# Patient Record
Sex: FEMALE | Race: WHITE | Employment: OTHER | URBAN - METROPOLITAN AREA
[De-identification: names, ages, dates, MRNs, and addresses within clinical notes are randomized per-mention and may not be internally consistent; named-entity substitution may affect disease eponyms.]

---

## 2024-09-16 ENCOUNTER — OFFICE VISIT (OUTPATIENT)
Dept: FAMILY MEDICINE CLINIC | Facility: CLINIC | Age: 74
End: 2024-09-16

## 2024-09-16 VITALS
BODY MASS INDEX: 22.75 KG/M2 | WEIGHT: 128.38 LBS | HEART RATE: 76 BPM | SYSTOLIC BLOOD PRESSURE: 118 MMHG | DIASTOLIC BLOOD PRESSURE: 75 MMHG | HEIGHT: 63 IN

## 2024-09-16 DIAGNOSIS — Z12.31 ENCOUNTER FOR SCREENING MAMMOGRAM FOR MALIGNANT NEOPLASM OF BREAST: ICD-10-CM

## 2024-09-16 DIAGNOSIS — Z13.21 SCREENING FOR ENDOCRINE, NUTRITIONAL, METABOLIC AND IMMUNITY DISORDER: Primary | ICD-10-CM

## 2024-09-16 DIAGNOSIS — Z13.228 SCREENING FOR ENDOCRINE, NUTRITIONAL, METABOLIC AND IMMUNITY DISORDER: Primary | ICD-10-CM

## 2024-09-16 DIAGNOSIS — Z13.0 SCREENING FOR ENDOCRINE, NUTRITIONAL, METABOLIC AND IMMUNITY DISORDER: Primary | ICD-10-CM

## 2024-09-16 DIAGNOSIS — Z80.3 FAMILY HISTORY OF BREAST CANCER IN MOTHER: ICD-10-CM

## 2024-09-16 DIAGNOSIS — R41.89 COGNITIVE CHANGE: ICD-10-CM

## 2024-09-16 DIAGNOSIS — Z13.29 SCREENING FOR ENDOCRINE, NUTRITIONAL, METABOLIC AND IMMUNITY DISORDER: Primary | ICD-10-CM

## 2024-09-16 PROCEDURE — 99203 OFFICE O/P NEW LOW 30 MIN: CPT | Performed by: STUDENT IN AN ORGANIZED HEALTH CARE EDUCATION/TRAINING PROGRAM

## 2024-09-16 RX ORDER — ALENDRONATE SODIUM 70 MG/1
70 TABLET ORAL WEEKLY
COMMUNITY
Start: 2024-05-31

## 2024-09-16 RX ORDER — ROSUVASTATIN CALCIUM 10 MG/1
10 TABLET, COATED ORAL NIGHTLY
COMMUNITY
Start: 2024-08-15

## 2024-09-16 NOTE — PROGRESS NOTES
HPI:    Patient ID: Talita Horan is a 74 year old female.    HPI  Pt presenting to establish care.  Werner present for visit.    Reports active lifestyle with healthy dietary choices  Only healthy concern is recent cognitive changes  Reports acute memory difficulties  Recent worsened decision making abilities  Decreased follow-through  Can feel less confident with far driving abilities (grew up in [place], hasn't wanted to drive solo)  Denies any mood changes or increased stress     is retired  physician   Has 4 kids    Recent elevated cholesterol  Rosuvastatin for last few months    FHx breast CA (mother, grandmother)  Colon CA, esophageal CA, lung CA  Parents smokers    Last Cscope in the last 5yrs    Q2yrs ivan, none recently    Review of Systems   A comprehensive 10 point review of systems was completed.  Pertinent positives and negatives noted in the the HPI.       Current Outpatient Medications   Medication Sig Dispense Refill    rosuvastatin 10 MG Oral Tab Take 1 tablet (10 mg total) by mouth nightly.      alendronate 70 MG Oral Tab Take 1 tablet (70 mg total) by mouth once a week.      Multiple Vitamins-Minerals (WOMENS 50+ MULTI VITAMIN/MIN) Oral Tab Take by mouth daily.       Allergies:  Allergies   Allergen Reactions    Sulfa Antibiotics ITCHING and RASH      Vitals:    09/16/24 1424   BP: 118/75   Pulse: 76   Weight: 128 lb 6.4 oz (58.2 kg)   Height: 5' 3\" (1.6 m)       Body mass index is 22.75 kg/m².   PHYSICAL EXAM:   Physical Exam  Vitals reviewed.   Constitutional:       General: She is not in acute distress.     Appearance: Normal appearance. She is well-developed.   HENT:      Head: Normocephalic and atraumatic.      Right Ear: Tympanic membrane, ear canal and external ear normal.      Left Ear: Tympanic membrane, ear canal and external ear normal.   Eyes:      Conjunctiva/sclera: Conjunctivae normal.   Neck:      Thyroid: No thyroid mass or thyroid tenderness.   Cardiovascular:       Rate and Rhythm: Normal rate and regular rhythm.      Pulses: Normal pulses.      Heart sounds: Normal heart sounds, S1 normal and S2 normal. No murmur heard.  Pulmonary:      Effort: Pulmonary effort is normal. No respiratory distress.      Breath sounds: Normal breath sounds. No wheezing, rhonchi or rales.   Chest:   Breasts:     Right: No mass or tenderness.      Left: No mass or tenderness.   Abdominal:      General: Bowel sounds are normal.      Palpations: Abdomen is soft.      Tenderness: There is no abdominal tenderness. There is no guarding or rebound.   Genitourinary:     General: Normal vulva.      Exam position: Lithotomy position.      Comments: Pessary in place  Musculoskeletal:      Cervical back: Normal range of motion and neck supple. No muscular tenderness.      Right lower leg: No edema.      Left lower leg: No edema.   Lymphadenopathy:      Cervical: No cervical adenopathy.      Upper Body:      Right upper body: No supraclavicular or axillary adenopathy.      Left upper body: No supraclavicular or axillary adenopathy.   Skin:     General: Skin is warm and dry.      Coloration: Skin is not jaundiced.   Neurological:      General: No focal deficit present.      Mental Status: She is alert and oriented to person, place, and time. Mental status is at baseline.   Psychiatric:         Attention and Perception: Attention normal.         Mood and Affect: Mood normal.         Behavior: Behavior normal. Behavior is cooperative.         Cognition and Memory: Cognition normal.             ASSESSMENT/PLAN:   1. Screening for endocrine, nutritional, metabolic and immunity disorder  Will obtain prior records for review  - TSH W Reflex To Free T4 [E]; Future    2. Family history of breast cancer in mother  Will update breast screening    3. Encounter for screening mammogram for malignant neoplasm of breast  - [facility] 2D+3D SCREENING BILAT (CPT=77067/13137); Future    4. Cognitive change  Counseled on  diet/activity changes for wellness  Continue to monitor for now    Pt verbalized understanding and agrees with plan.    Orders Placed This Encounter   Procedures    TSH W Reflex To Free T4 [E]       Meds This Visit:  Requested Prescriptions      No prescriptions requested or ordered in this encounter       Imaging & Referrals:  None         ID#[id number]

## 2024-09-18 ENCOUNTER — HOSPITAL ENCOUNTER (OUTPATIENT)
Dept: MAMMOGRAPHY | Facility: HOSPITAL | Age: 74
Discharge: HOME | End: 2024-09-18
Attending: STUDENT IN AN ORGANIZED HEALTH CARE EDUCATION/TRAINING PROGRAM
Payer: MEDICARE

## 2024-09-18 DIAGNOSIS — Z12.31 ENCOUNTER FOR SCREENING MAMMOGRAM FOR MALIGNANT NEOPLASM OF BREAST: ICD-10-CM

## 2024-09-18 PROCEDURE — 77063 BREAST TOMOSYNTHESIS BI: CPT | Performed by: STUDENT IN AN ORGANIZED HEALTH CARE EDUCATION/TRAINING PROGRAM

## 2024-09-18 PROCEDURE — 77067 SCR MAMMO BI INCL CAD: CPT | Performed by: STUDENT IN AN ORGANIZED HEALTH CARE EDUCATION/TRAINING PROGRAM

## 2024-12-10 ENCOUNTER — OFFICE VISIT (OUTPATIENT)
Dept: FAMILY MEDICINE CLINIC | Facility: CLINIC | Age: 74
End: 2024-12-10

## 2024-12-10 VITALS
WEIGHT: 131 LBS | BODY MASS INDEX: 23.21 KG/M2 | TEMPERATURE: 98 F | DIASTOLIC BLOOD PRESSURE: 70 MMHG | RESPIRATION RATE: 18 BRPM | HEART RATE: 81 BPM | OXYGEN SATURATION: 98 % | HEIGHT: 63 IN | SYSTOLIC BLOOD PRESSURE: 125 MMHG

## 2024-12-10 DIAGNOSIS — R21 RASH: Primary | ICD-10-CM

## 2024-12-10 DIAGNOSIS — L29.9 ITCHY SKIN: ICD-10-CM

## 2024-12-10 PROCEDURE — 1160F RVW MEDS BY RX/DR IN RCRD: CPT

## 2024-12-10 PROCEDURE — 99213 OFFICE O/P EST LOW 20 MIN: CPT

## 2024-12-10 PROCEDURE — 1159F MED LIST DOCD IN RCRD: CPT

## 2024-12-10 RX ORDER — TRIAMCINOLONE ACETONIDE 1 MG/G
CREAM TOPICAL
Qty: 60 G | Refills: 0 | Status: SHIPPED | OUTPATIENT
Start: 2024-12-10

## 2024-12-10 NOTE — PROGRESS NOTES
HPI:    Patient ID: Talita Horan is a 74 year old female.    HPI     Patient here in office with complaint of generalized dry/itchy skin.  Denies new soap, lotion, detergent, or medications.  Has tried hydrocortisone cream at night, provides moderate relief.        Review of Systems   Constitutional: Negative.    Cardiovascular: Negative.    Skin:  Negative for rash.        Dry/itchy   Neurological: Negative.    Psychiatric/Behavioral: Negative.              Current Outpatient Medications   Medication Sig Dispense Refill    triamcinolone 0.1 % External Cream Apply to affected area twice daily x 2 weeks. Stop for 2 weeks, repeat as needed 60 g 0    rosuvastatin 10 MG Oral Tab Take 1 tablet (10 mg total) by mouth nightly.      alendronate 70 MG Oral Tab Take 1 tablet (70 mg total) by mouth once a week.      Multiple Vitamins-Minerals (WOMENS 50+ MULTI VITAMIN/MIN) Oral Tab Take by mouth daily.       Allergies:Allergies[1]   /70   Pulse 81   Temp 97.5 °F (36.4 °C) (Temporal)   Resp 18   Ht 5' 3\" (1.6 m)   Wt 131 lb (59.4 kg)   SpO2 98%   BMI 23.21 kg/m²   Body mass index is 23.21 kg/m².  PHYSICAL EXAM:   Physical Exam  Vitals reviewed.   Constitutional:       Appearance: Normal appearance.   Cardiovascular:      Rate and Rhythm: Normal rate and regular rhythm.      Heart sounds: Normal heart sounds. No murmur heard.     No friction rub. No gallop.   Pulmonary:      Effort: Pulmonary effort is normal. No respiratory distress.      Breath sounds: Normal breath sounds. No stridor. No wheezing, rhonchi or rales.   Chest:      Chest wall: No tenderness.   Skin:     General: Skin is warm.      Findings: No rash.      Comments: Generalized dry skin with mild excoriation   Neurological:      General: No focal deficit present.      Mental Status: She is alert and oriented to person, place, and time.   Psychiatric:         Mood and Affect: Mood normal.         Behavior: Behavior normal.         Thought  Content: Thought content normal.         Judgment: Judgment normal.                ASSESSMENT/PLAN:   1. Rash  - triamcinolone 0.1 % External Cream, Apply to affected area twice daily x 2 weeks. Stop for 2 weeks, repeat as needed   - Derm Referral - In Network    2. Itchy skin  - Use Dove sensitive skin bar soap. Eucerin/Aveeno lotion. Tide/All free and clear detergent   -Referral given to dermatology if symptoms not improving  - Derm Referral - In Network      No orders of the defined types were placed in this encounter.      Meds This Visit:  Requested Prescriptions     Signed Prescriptions Disp Refills    triamcinolone 0.1 % External Cream 60 g 0     Sig: Apply to affected area twice daily x 2 weeks. Stop for 2 weeks, repeat as needed       Imaging & Referrals:  DERM - INTERNAL         ID#[id number]         [1]   Allergies  Allergen Reactions    Sulfa Antibiotics ITCHING and RASH

## 2025-01-23 ENCOUNTER — TELEPHONE (OUTPATIENT)
Dept: FAMILY MEDICINE CLINIC | Facility: CLINIC | Age: 75
End: 2025-01-23

## 2025-01-23 NOTE — TELEPHONE ENCOUNTER
SHALOM w/ answering service [facility]- we have no recent labs for this patient.

## 2025-01-23 NOTE — TELEPHONE ENCOUNTER
Ashia from [facility] is calling to advise the patient has an appointment tomorrow at [facility] and requesting lab results be faxed    TSH Thyroid Stimulating    Vitamin B12    FAX # [phone]

## 2025-01-24 ENCOUNTER — LAB ENCOUNTER (OUTPATIENT)
Dept: LAB | Age: 75
End: 2025-01-24
Attending: FAMILY MEDICINE
Payer: MEDICARE

## 2025-01-24 ENCOUNTER — TELEPHONE (OUTPATIENT)
Dept: INTERNAL MEDICINE CLINIC | Facility: CLINIC | Age: 75
End: 2025-01-24

## 2025-01-24 ENCOUNTER — OFFICE VISIT (OUTPATIENT)
Dept: FAMILY MEDICINE CLINIC | Facility: CLINIC | Age: 75
End: 2025-01-24
Payer: MEDICARE

## 2025-01-24 VITALS
WEIGHT: 130 LBS | DIASTOLIC BLOOD PRESSURE: 69 MMHG | BODY MASS INDEX: 23.04 KG/M2 | HEIGHT: 63 IN | SYSTOLIC BLOOD PRESSURE: 120 MMHG

## 2025-01-24 DIAGNOSIS — R30.0 DYSURIA: ICD-10-CM

## 2025-01-24 DIAGNOSIS — R30.0 DYSURIA: Primary | ICD-10-CM

## 2025-01-24 LAB
BILIRUB UR QL: NEGATIVE
COLOR UR: YELLOW
GLUCOSE UR-MCNC: NORMAL MG/DL
KETONES UR-MCNC: NEGATIVE MG/DL
LEUKOCYTE ESTERASE UR QL STRIP.AUTO: 500
NITRITE UR QL STRIP.AUTO: NEGATIVE
PH UR: 6.5 [PH] (ref 5–8)
SP GR UR STRIP: 1.02 (ref 1–1.03)
UROBILINOGEN UR STRIP-ACNC: NORMAL
WBC #/AREA URNS AUTO: >50 /HPF

## 2025-01-24 PROCEDURE — 87077 CULTURE AEROBIC IDENTIFY: CPT

## 2025-01-24 PROCEDURE — 81001 URINALYSIS AUTO W/SCOPE: CPT

## 2025-01-24 PROCEDURE — 99213 OFFICE O/P EST LOW 20 MIN: CPT | Performed by: FAMILY MEDICINE

## 2025-01-24 PROCEDURE — 87186 SC STD MICRODIL/AGAR DIL: CPT

## 2025-01-24 PROCEDURE — 1159F MED LIST DOCD IN RCRD: CPT | Performed by: FAMILY MEDICINE

## 2025-01-24 PROCEDURE — 87086 URINE CULTURE/COLONY COUNT: CPT

## 2025-01-24 PROCEDURE — 1125F AMNT PAIN NOTED PAIN PRSNT: CPT | Performed by: FAMILY MEDICINE

## 2025-01-24 PROCEDURE — 1160F RVW MEDS BY RX/DR IN RCRD: CPT | Performed by: FAMILY MEDICINE

## 2025-01-24 RX ORDER — CIPROFLOXACIN 500 MG/1
500 TABLET, FILM COATED ORAL 2 TIMES DAILY
Qty: 10 TABLET | Refills: 0 | Status: SHIPPED | OUTPATIENT
Start: 2025-01-24 | End: 2025-01-29

## 2025-01-24 NOTE — TELEPHONE ENCOUNTER
TRAMADOL/APAP 37.5MG/ 325MG tabs   TAKE 1 TABLET BY MOUTH EVERY 8 HOURS as NEEDED.     Prescribed Qty: 249  Last Refill: 05/15/2024    The current prescription has no refills remaining or is about to .. if you want to continue therapy please prepare and fax a valid signed written prescription to the pharmacy.

## 2025-01-24 NOTE — PROGRESS NOTES
Blood pressure 120/69, height 5' 3\" (1.6 m), weight 130 lb (59 kg).          Patient presents today complaining of burning with urination began today.  Denies fever or vomiting.  No chills or back pain.    Objective back without CVA tenderness    Assessment dysuria patient unable to produce urine in office    Plan will go to lab produce urine    Drink cranberry juice sulfa allergy noted    Encouraged compliance for routine health maintenance follow-up with Dr. Dickerson for Medicare annual physical

## 2025-01-28 ENCOUNTER — LAB ENCOUNTER (OUTPATIENT)
Dept: LAB | Age: 75
End: 2025-01-28
Attending: STUDENT IN AN ORGANIZED HEALTH CARE EDUCATION/TRAINING PROGRAM
Payer: MEDICARE

## 2025-01-28 DIAGNOSIS — Z13.21 SCREENING FOR ENDOCRINE, NUTRITIONAL, METABOLIC AND IMMUNITY DISORDER: ICD-10-CM

## 2025-01-28 DIAGNOSIS — Z13.228 SCREENING FOR ENDOCRINE, NUTRITIONAL, METABOLIC AND IMMUNITY DISORDER: ICD-10-CM

## 2025-01-28 DIAGNOSIS — Z13.29 SCREENING FOR ENDOCRINE, NUTRITIONAL, METABOLIC AND IMMUNITY DISORDER: ICD-10-CM

## 2025-01-28 DIAGNOSIS — Z13.0 SCREENING FOR ENDOCRINE, NUTRITIONAL, METABOLIC AND IMMUNITY DISORDER: ICD-10-CM

## 2025-01-28 LAB — TSI SER-ACNC: 1.38 UIU/ML (ref 0.55–4.78)

## 2025-01-28 PROCEDURE — 84443 ASSAY THYROID STIM HORMONE: CPT

## 2025-01-28 PROCEDURE — 36415 COLL VENOUS BLD VENIPUNCTURE: CPT

## 2025-02-08 ENCOUNTER — HOSPITAL ENCOUNTER (OUTPATIENT)
Facility: HOSPITAL | Age: 75
Setting detail: OBSERVATION
Discharge: HOME | End: 2025-02-09
Attending: EMERGENCY MEDICINE | Admitting: INTERNAL MEDICINE
Payer: MEDICARE

## 2025-02-08 ENCOUNTER — ANESTHESIA (OUTPATIENT)
Dept: SURGERY | Facility: HOSPITAL | Age: 75
End: 2025-02-08
Payer: MEDICARE

## 2025-02-08 ENCOUNTER — APPOINTMENT (OUTPATIENT)
Dept: CT IMAGING | Facility: HOSPITAL | Age: 75
End: 2025-02-08
Attending: EMERGENCY MEDICINE
Payer: MEDICARE

## 2025-02-08 ENCOUNTER — ANESTHESIA EVENT (OUTPATIENT)
Dept: SURGERY | Facility: HOSPITAL | Age: 75
End: 2025-02-08
Payer: MEDICARE

## 2025-02-08 DIAGNOSIS — K35.80 ACUTE APPENDICITIS: ICD-10-CM

## 2025-02-08 DIAGNOSIS — K35.30 ACUTE APPENDICITIS WITH LOCALIZED PERITONITIS, WITHOUT PERFORATION, ABSCESS, OR GANGRENE: Primary | ICD-10-CM

## 2025-02-08 LAB
ANION GAP SERPL CALC-SCNC: 9 MMOL/L (ref 0–18)
ATRIAL RATE: 102 BPM
BASOPHILS # BLD AUTO: 0.04 X10(3) UL (ref 0–0.2)
BASOPHILS NFR BLD AUTO: 0.3 %
BILIRUB UR QL: NEGATIVE
BUN BLD-MCNC: 27 MG/DL (ref 9–23)
BUN/CREAT SERPL: 35.1 (ref 10–20)
CALCIUM BLD-MCNC: 9.4 MG/DL (ref 8.7–10.4)
CHLORIDE SERPL-SCNC: 103 MMOL/L (ref 98–112)
CLARITY UR: CLEAR
CO2 SERPL-SCNC: 29 MMOL/L (ref 21–32)
CREAT BLD-MCNC: 0.77 MG/DL
DEPRECATED RDW RBC AUTO: 37.7 FL (ref 35.1–46.3)
EGFRCR SERPLBLD CKD-EPI 2021: 81 ML/MIN/1.73M2 (ref 60–?)
EOSINOPHIL # BLD AUTO: 0.01 X10(3) UL (ref 0–0.7)
EOSINOPHIL NFR BLD AUTO: 0.1 %
ERYTHROCYTE [DISTWIDTH] IN BLOOD BY AUTOMATED COUNT: 11.6 % (ref 11–15)
GLUCOSE BLD-MCNC: 151 MG/DL (ref 70–99)
GLUCOSE UR-MCNC: 100 MG/DL
HCT VFR BLD AUTO: 41.4 %
HGB BLD-MCNC: 14.4 G/DL
HGB UR QL STRIP.AUTO: NEGATIVE
IMM GRANULOCYTES # BLD AUTO: 0.08 X10(3) UL (ref 0–1)
IMM GRANULOCYTES NFR BLD: 0.5 %
KETONES UR-MCNC: 20 MG/DL
LEUKOCYTE ESTERASE UR QL STRIP.AUTO: 500
LYMPHOCYTES # BLD AUTO: 0.67 X10(3) UL (ref 1–4)
LYMPHOCYTES NFR BLD AUTO: 4.3 %
MCH RBC QN AUTO: 31.2 PG (ref 26–34)
MCHC RBC AUTO-ENTMCNC: 34.8 G/DL (ref 31–37)
MCV RBC AUTO: 89.6 FL
MONOCYTES # BLD AUTO: 0.46 X10(3) UL (ref 0.1–1)
MONOCYTES NFR BLD AUTO: 2.9 %
NEUTROPHILS # BLD AUTO: 14.41 X10 (3) UL (ref 1.5–7.7)
NEUTROPHILS # BLD AUTO: 14.41 X10(3) UL (ref 1.5–7.7)
NEUTROPHILS NFR BLD AUTO: 91.9 %
NITRITE UR QL STRIP.AUTO: NEGATIVE
OSMOLALITY SERPL CALC.SUM OF ELEC: 300 MOSM/KG (ref 275–295)
P AXIS: 71 DEGREES
P-R INTERVAL: 142 MS
PH UR: 6 [PH] (ref 5–8)
PLATELET # BLD AUTO: 198 10(3)UL (ref 150–450)
POTASSIUM SERPL-SCNC: 3.6 MMOL/L (ref 3.5–5.1)
PROT UR-MCNC: NEGATIVE MG/DL
Q-T INTERVAL: 354 MS
QRS DURATION: 74 MS
QTC CALCULATION (BEZET): 461 MS
R AXIS: 29 DEGREES
RBC # BLD AUTO: 4.62 X10(6)UL
SODIUM SERPL-SCNC: 141 MMOL/L (ref 136–145)
SP GR UR STRIP: 1.02 (ref 1–1.03)
T AXIS: 55 DEGREES
UROBILINOGEN UR STRIP-ACNC: NORMAL
VENTRICULAR RATE: 102 BPM
WBC # BLD AUTO: 15.7 X10(3) UL (ref 4–11)

## 2025-02-08 PROCEDURE — 0DTJ4ZZ RESECTION OF APPENDIX, PERCUTANEOUS ENDOSCOPIC APPROACH: ICD-10-PCS | Performed by: SURGERY

## 2025-02-08 PROCEDURE — 74177 CT ABD & PELVIS W/CONTRAST: CPT | Performed by: EMERGENCY MEDICINE

## 2025-02-08 PROCEDURE — 44970 LAPAROSCOPY APPENDECTOMY: CPT | Performed by: SURGERY

## 2025-02-08 PROCEDURE — 99223 1ST HOSP IP/OBS HIGH 75: CPT | Performed by: INTERNAL MEDICINE

## 2025-02-08 PROCEDURE — 99221 1ST HOSP IP/OBS SF/LOW 40: CPT | Performed by: SURGERY

## 2025-02-08 RX ORDER — DEXAMETHASONE SODIUM PHOSPHATE 4 MG/ML
VIAL (ML) INJECTION AS NEEDED
Status: DISCONTINUED | OUTPATIENT
Start: 2025-02-08 | End: 2025-02-08 | Stop reason: SURG

## 2025-02-08 RX ORDER — HYDROMORPHONE HYDROCHLORIDE 1 MG/ML
0.4 INJECTION, SOLUTION INTRAMUSCULAR; INTRAVENOUS; SUBCUTANEOUS EVERY 5 MIN PRN
Status: DISCONTINUED | OUTPATIENT
Start: 2025-02-08 | End: 2025-02-08 | Stop reason: HOSPADM

## 2025-02-08 RX ORDER — LIDOCAINE HYDROCHLORIDE 10 MG/ML
INJECTION, SOLUTION EPIDURAL; INFILTRATION; INTRACAUDAL; PERINEURAL AS NEEDED
Status: DISCONTINUED | OUTPATIENT
Start: 2025-02-08 | End: 2025-02-08 | Stop reason: SURG

## 2025-02-08 RX ORDER — METOCLOPRAMIDE HYDROCHLORIDE 5 MG/ML
5 INJECTION INTRAMUSCULAR; INTRAVENOUS EVERY 8 HOURS PRN
Status: DISCONTINUED | OUTPATIENT
Start: 2025-02-08 | End: 2025-02-09

## 2025-02-08 RX ORDER — HYDROCODONE BITARTRATE AND ACETAMINOPHEN 5; 325 MG/1; MG/1
2 TABLET ORAL EVERY 4 HOURS PRN
Status: DISCONTINUED | OUTPATIENT
Start: 2025-02-08 | End: 2025-02-09

## 2025-02-08 RX ORDER — IBUPROFEN 600 MG/1
600 TABLET, FILM COATED ORAL EVERY 6 HOURS PRN
Qty: 15 TABLET | Refills: 1 | Status: SHIPPED | OUTPATIENT
Start: 2025-02-08 | End: 2025-02-15

## 2025-02-08 RX ORDER — HYDROMORPHONE HYDROCHLORIDE 1 MG/ML
0.2 INJECTION, SOLUTION INTRAMUSCULAR; INTRAVENOUS; SUBCUTANEOUS EVERY 5 MIN PRN
Status: DISCONTINUED | OUTPATIENT
Start: 2025-02-08 | End: 2025-02-08 | Stop reason: HOSPADM

## 2025-02-08 RX ORDER — NALOXONE HYDROCHLORIDE 0.4 MG/ML
0.08 INJECTION, SOLUTION INTRAMUSCULAR; INTRAVENOUS; SUBCUTANEOUS AS NEEDED
Status: DISCONTINUED | OUTPATIENT
Start: 2025-02-08 | End: 2025-02-08 | Stop reason: HOSPADM

## 2025-02-08 RX ORDER — POLYETHYLENE GLYCOL 3350 17 G/17G
17 POWDER, FOR SOLUTION ORAL DAILY PRN
Status: DISCONTINUED | OUTPATIENT
Start: 2025-02-08 | End: 2025-02-09

## 2025-02-08 RX ORDER — MORPHINE SULFATE 2 MG/ML
2 INJECTION, SOLUTION INTRAMUSCULAR; INTRAVENOUS ONCE
Status: COMPLETED | OUTPATIENT
Start: 2025-02-08 | End: 2025-02-08

## 2025-02-08 RX ORDER — ROCURONIUM BROMIDE 10 MG/ML
INJECTION, SOLUTION INTRAVENOUS AS NEEDED
Status: DISCONTINUED | OUTPATIENT
Start: 2025-02-08 | End: 2025-02-08 | Stop reason: SURG

## 2025-02-08 RX ORDER — HEPARIN SODIUM 5000 [USP'U]/ML
5000 INJECTION, SOLUTION INTRAVENOUS; SUBCUTANEOUS EVERY 8 HOURS SCHEDULED
Status: DISCONTINUED | OUTPATIENT
Start: 2025-02-08 | End: 2025-02-09

## 2025-02-08 RX ORDER — HYDROCODONE BITARTRATE AND ACETAMINOPHEN 5; 325 MG/1; MG/1
1 TABLET ORAL EVERY 6 HOURS PRN
Qty: 10 TABLET | Refills: 0 | Status: SHIPPED | OUTPATIENT
Start: 2025-02-08

## 2025-02-08 RX ORDER — SODIUM PHOSPHATE, DIBASIC AND SODIUM PHOSPHATE, MONOBASIC 7; 19 G/230ML; G/230ML
1 ENEMA RECTAL ONCE AS NEEDED
Status: DISCONTINUED | OUTPATIENT
Start: 2025-02-08 | End: 2025-02-09

## 2025-02-08 RX ORDER — MORPHINE SULFATE 4 MG/ML
4 INJECTION, SOLUTION INTRAMUSCULAR; INTRAVENOUS EVERY 2 HOUR PRN
Status: DISCONTINUED | OUTPATIENT
Start: 2025-02-08 | End: 2025-02-09

## 2025-02-08 RX ORDER — ONDANSETRON 2 MG/ML
INJECTION INTRAMUSCULAR; INTRAVENOUS AS NEEDED
Status: DISCONTINUED | OUTPATIENT
Start: 2025-02-08 | End: 2025-02-08 | Stop reason: SURG

## 2025-02-08 RX ORDER — DOCUSATE SODIUM 100 MG/1
100 CAPSULE, LIQUID FILLED ORAL 2 TIMES DAILY
Qty: 30 CAPSULE | Refills: 0 | Status: SHIPPED | OUTPATIENT
Start: 2025-02-08

## 2025-02-08 RX ORDER — ONDANSETRON 2 MG/ML
4 INJECTION INTRAMUSCULAR; INTRAVENOUS EVERY 6 HOURS PRN
Status: DISCONTINUED | OUTPATIENT
Start: 2025-02-08 | End: 2025-02-09

## 2025-02-08 RX ORDER — KETOROLAC TROMETHAMINE 15 MG/ML
15 INJECTION, SOLUTION INTRAMUSCULAR; INTRAVENOUS EVERY 6 HOURS PRN
Status: DISCONTINUED | OUTPATIENT
Start: 2025-02-08 | End: 2025-02-09

## 2025-02-08 RX ORDER — SODIUM CHLORIDE 9 MG/ML
125 INJECTION, SOLUTION INTRAVENOUS CONTINUOUS
Status: DISCONTINUED | OUTPATIENT
Start: 2025-02-08 | End: 2025-02-09

## 2025-02-08 RX ORDER — HYDROCODONE BITARTRATE AND ACETAMINOPHEN 5; 325 MG/1; MG/1
1 TABLET ORAL EVERY 4 HOURS PRN
Status: DISCONTINUED | OUTPATIENT
Start: 2025-02-08 | End: 2025-02-09

## 2025-02-08 RX ORDER — HYDROCODONE BITARTRATE AND ACETAMINOPHEN 5; 325 MG/1; MG/1
1 TABLET ORAL EVERY 6 HOURS PRN
Status: DISCONTINUED | OUTPATIENT
Start: 2025-02-08 | End: 2025-02-08 | Stop reason: ALTCHOICE

## 2025-02-08 RX ORDER — ONDANSETRON 2 MG/ML
4 INJECTION INTRAMUSCULAR; INTRAVENOUS ONCE
Status: COMPLETED | OUTPATIENT
Start: 2025-02-08 | End: 2025-02-08

## 2025-02-08 RX ORDER — METRONIDAZOLE 500 MG/100ML
500 INJECTION, SOLUTION INTRAVENOUS ONCE
Status: COMPLETED | OUTPATIENT
Start: 2025-02-08 | End: 2025-02-08

## 2025-02-08 RX ORDER — BISACODYL 10 MG
10 SUPPOSITORY, RECTAL RECTAL
Status: DISCONTINUED | OUTPATIENT
Start: 2025-02-08 | End: 2025-02-09

## 2025-02-08 RX ORDER — MORPHINE SULFATE 10 MG/ML
6 INJECTION, SOLUTION INTRAMUSCULAR; INTRAVENOUS EVERY 10 MIN PRN
Status: DISCONTINUED | OUTPATIENT
Start: 2025-02-08 | End: 2025-02-08 | Stop reason: HOSPADM

## 2025-02-08 RX ORDER — BENZONATATE 100 MG/1
200 CAPSULE ORAL 3 TIMES DAILY PRN
Status: DISCONTINUED | OUTPATIENT
Start: 2025-02-08 | End: 2025-02-09

## 2025-02-08 RX ORDER — SODIUM CHLORIDE, SODIUM LACTATE, POTASSIUM CHLORIDE, CALCIUM CHLORIDE 600; 310; 30; 20 MG/100ML; MG/100ML; MG/100ML; MG/100ML
INJECTION, SOLUTION INTRAVENOUS CONTINUOUS
Status: DISCONTINUED | OUTPATIENT
Start: 2025-02-08 | End: 2025-02-08 | Stop reason: HOSPADM

## 2025-02-08 RX ORDER — ACETAMINOPHEN 325 MG/1
650 TABLET ORAL EVERY 4 HOURS PRN
Status: DISCONTINUED | OUTPATIENT
Start: 2025-02-08 | End: 2025-02-09

## 2025-02-08 RX ORDER — MORPHINE SULFATE 2 MG/ML
2 INJECTION, SOLUTION INTRAMUSCULAR; INTRAVENOUS EVERY 2 HOUR PRN
Status: DISCONTINUED | OUTPATIENT
Start: 2025-02-08 | End: 2025-02-09

## 2025-02-08 RX ORDER — MORPHINE SULFATE 4 MG/ML
4 INJECTION, SOLUTION INTRAMUSCULAR; INTRAVENOUS EVERY 10 MIN PRN
Status: DISCONTINUED | OUTPATIENT
Start: 2025-02-08 | End: 2025-02-08 | Stop reason: HOSPADM

## 2025-02-08 RX ORDER — HYDROMORPHONE HYDROCHLORIDE 1 MG/ML
0.6 INJECTION, SOLUTION INTRAMUSCULAR; INTRAVENOUS; SUBCUTANEOUS EVERY 5 MIN PRN
Status: DISCONTINUED | OUTPATIENT
Start: 2025-02-08 | End: 2025-02-08 | Stop reason: HOSPADM

## 2025-02-08 RX ORDER — MORPHINE SULFATE 2 MG/ML
1 INJECTION, SOLUTION INTRAMUSCULAR; INTRAVENOUS EVERY 2 HOUR PRN
Status: DISCONTINUED | OUTPATIENT
Start: 2025-02-08 | End: 2025-02-09

## 2025-02-08 RX ORDER — SENNOSIDES 8.6 MG
17.2 TABLET ORAL NIGHTLY PRN
Status: DISCONTINUED | OUTPATIENT
Start: 2025-02-08 | End: 2025-02-09

## 2025-02-08 RX ORDER — KETOROLAC TROMETHAMINE 15 MG/ML
15 INJECTION, SOLUTION INTRAMUSCULAR; INTRAVENOUS ONCE
Status: COMPLETED | OUTPATIENT
Start: 2025-02-08 | End: 2025-02-08

## 2025-02-08 RX ORDER — MORPHINE SULFATE 4 MG/ML
2 INJECTION, SOLUTION INTRAMUSCULAR; INTRAVENOUS EVERY 10 MIN PRN
Status: DISCONTINUED | OUTPATIENT
Start: 2025-02-08 | End: 2025-02-08 | Stop reason: HOSPADM

## 2025-02-08 RX ADMIN — LIDOCAINE HYDROCHLORIDE 50 MG: 10 INJECTION, SOLUTION EPIDURAL; INFILTRATION; INTRACAUDAL; PERINEURAL at 11:48:00

## 2025-02-08 RX ADMIN — ROCURONIUM BROMIDE 50 MG: 10 INJECTION, SOLUTION INTRAVENOUS at 11:48:00

## 2025-02-08 RX ADMIN — DEXAMETHASONE SODIUM PHOSPHATE 4 MG: 4 MG/ML VIAL (ML) INJECTION at 11:48:00

## 2025-02-08 RX ADMIN — ONDANSETRON 4 MG: 2 INJECTION INTRAMUSCULAR; INTRAVENOUS at 11:48:00

## 2025-02-08 NOTE — ANESTHESIA PROCEDURE NOTES
Airway  Date/Time: 2/8/2025 11:49 AM  Urgency: Elective    Airway not difficult    General Information and Staff    Patient location during procedure: OR  Anesthesiologist: Cary Calle MD  Performed: anesthesiologist   Performed by: Cary Calle MD  Authorized by: Cary Calle MD      Indications and Patient Condition  Indications for airway management: anesthesia  Sedation level: deep  Preoxygenated: yes  Patient position: sniffing  Mask difficulty assessment: 1 - vent by mask    Final Airway Details  Final airway type: endotracheal airway      Successful airway: ETT  Cuffed: yes   Successful intubation technique: Video laryngoscopy  Facilitating devices/methods: intubating stylet  Endotracheal tube insertion site: oral  Blade: GlideScope  Blade size: #3  ETT size (mm): 7.0    Cormack-Lehane Classification: grade I - full view of glottis  Placement verified by: capnometry   Measured from: teeth  Number of attempts at approach: 1

## 2025-02-08 NOTE — H&P
[facility]  part of [facility]    History and Physical     Talita Hroan Patient Status:  Emergency    1950 MRN [id number]   Location [facility] POST ANESTHESIA CARE UNIT Attending Darwin Valencia MD   Hosp Day # 0 PCP Charley Dickerson MD     History of Present Illness: Talita Horan is a 74 year old female with a past medical history of uterine prolapse, mild cognitive impairment and HLD presented ot the ER with complaints of abdominal pain that began last night. The pain is sharp, non-radiating, in the RLQ and associated with nausea and vomiting.   She denied any other complaints at the time of interview.    Past Medical History:  Past Medical History:    Hyperlipidemia    UTI (urinary tract infection)        Past Surgical History:   Past Surgical History:   Procedure Laterality Date    Colonoscopy      Knee surgery Right           Tonsillectomy         Social History:  reports that she has never smoked. She has never been exposed to tobacco smoke. She has never used smokeless tobacco. She reports current alcohol use. She reports that she does not use drugs.    Family History:   Family History   Problem Relation Age of Onset    Cancer Mother     Breast Cancer Mother     Cancer Father     Breast Cancer Maternal Grandmother        Allergies: Allergies[1]    Medications:  Medications Ordered Prior to Encounter[2]    Review of Systems:   A comprehensive 14 point review of systems was completed.    Pertinent positives and negatives noted in the HPI.    Physical Exam:    /56   Pulse 113   Temp 98.1 °F (36.7 °C) (Temporal)   Resp 17   Ht 5' 3\" (1.6 m)   Wt 125 lb (56.7 kg)   SpO2 94%   BMI 22.14 kg/m²   General: No acute distress. Alert and oriented x 3.  Respiratory: Clear to auscultation bilaterally. No wheezes. No rhonchi.  Cardiovascular: S1, S2. Regular rate and rhythm. No murmurs, no rubs or gallops. Equal pulses.   Abdomen: Soft, TTP in  RLQ  Neurologic: No focal neurological deficits. CNII-XII grossly intact.  Extremities: No edema or cyanosis.      Diagnostic Data:      Labs:  Recent Labs   Lab 25  0748   WBC 15.7*   HGB 14.4   MCV 89.6   .0       Recent Labs   Lab 25  0748   *   BUN 27*   CREATSERUM 0.77   CA 9.4      K 3.6      CO2 29.0       Estimated Creatinine Clearance: 53.8 mL/min (based on SCr of 0.77 mg/dL).    No results for input(s): \"PTP\", \"INR\" in the last 168 hours.    No results for input(s): \"TROP\", \"CK\" in the last 168 hours.    Imaging: Imaging data reviewed in Epic.      ASSESSMENT / PLAN:     Acute appendicitis  Imaging reviewed  Started on IVF and IV abx  PRN pain control  Gsx on consult  Lap appe later today  HLD  Continue home meds      Quality:  DVT Prophylaxis: SCDs  CODE status:     Plan of care discussed with ED physician    Valery Bautista MD  2025                         The  [employer] Cures Act makes medical notes like these available to patients in the interest of transparency. Please be advised this is a medical document. Medical documents are intended to carry relevant information, facts as evident, and the clinical opinion of the practitioner. The medical note is intended as peer to peer communication and may appear blunt or direct. It is written in medical language and may contain abbreviations or verbiage that are unfamiliar.        [1]   Allergies  Allergen Reactions    Penicillins RASH    Sulfa Antibiotics ITCHING and RASH   [2]   No current facility-administered medications on file prior to encounter.     Current Outpatient Medications on File Prior to Encounter   Medication Sig Dispense Refill    [] ciprofloxacin (CIPRO) 500 MG Oral Tab Take 1 tablet (500 mg total) by mouth 2 (two) times daily for 5 days. 10 tablet 0    triamcinolone 0.1 % External Cream Apply to affected area twice daily x 2 weeks. Stop for 2 weeks, repeat as needed 60 g 0    rosuvastatin 10  MG Oral Tab Take 1 tablet (10 mg total) by mouth nightly.      alendronate 70 MG Oral Tab Take 1 tablet (70 mg total) by mouth once a week.      Multiple Vitamins-Minerals (WOMENS 50+ MULTI VITAMIN/MIN) Oral Tab Take by mouth daily.

## 2025-02-08 NOTE — ED INITIAL ASSESSMENT (HPI)
C/o abd pain to her RLQ along w/ vomiting. Symptom onset occurred around 2200 last night. States she's had to wake up several times overnight d/t her vomiting episodes. Describes pain as sharp and intermittent, rates 5/10, and is non-radiating. Denies further symptoms or GI hx.

## 2025-02-08 NOTE — ED PROVIDER NOTES
Patient Seen in: [facility] Emergency Department      History     Chief Complaint   Patient presents with    Abdomen/Flank Pain     Stated Complaint: abdominal pain    Subjective:   HPI    74-year-old female with mild cognitive impairment, hyperlipidemia, uterine prolapse with pessary, presents for evaluation of abdominal pain.  Symptoms started yesterday evening.  She describes a sharp pain in the right lower quadrant which is intermittent and nonradiating.  Its associated with nausea and vomiting.  No dysuria or hematuria, diarrhea or constipation, blood in stool, fever.  No abdominal surgical history.      Objective:     Past Medical History:    Hyperlipidemia    UTI (urinary tract infection)              Past Surgical History:   Procedure Laterality Date    Colonoscopy      Knee surgery Right           Tonsillectomy                  Social History     Socioeconomic History    Marital status:    Tobacco Use    Smoking status: Never     Passive exposure: Never    Smokeless tobacco: Never   Vaping Use    Vaping status: Never Used   Substance and Sexual Activity    Alcohol use: Yes     Comment: very occasional    Drug use: Never     Social Drivers of Health      Received from [facility]    Housing Stability                  Physical Exam     ED Triage Vitals [25 0515]   /61   Pulse 104   Resp 20   Temp 97.4 °F (36.3 °C)   Temp src Oral   SpO2 98 %   O2 Device None (Room air)       Current Vitals:   Vital Signs  BP: 134/72  Pulse: 102  Resp: 14  Temp: 97.4 °F (36.3 °C)  Temp src: Oral  MAP (mmHg): 87    Oxygen Therapy  SpO2: 99 %  O2 Device: None (Room air)        Physical Exam  Vitals and nursing note reviewed.   Constitutional:       Appearance: She is well-developed.   HENT:      Head: Normocephalic and atraumatic.   Eyes:      Extraocular Movements: Extraocular movements intact.   Cardiovascular:      Rate and Rhythm: Normal rate and regular rhythm.       Heart sounds: Normal heart sounds.   Pulmonary:      Effort: Pulmonary effort is normal.      Breath sounds: Normal breath sounds.   Abdominal:      General: There is no distension.      Palpations: Abdomen is soft.      Tenderness: There is abdominal tenderness in the right lower quadrant. There is no right CVA tenderness or left CVA tenderness.   Musculoskeletal:         General: Normal range of motion.      Cervical back: Normal range of motion.   Skin:     General: Skin is warm.   Neurological:      Mental Status: She is alert.      Comments: No focal deficits       Differential diagnosis includes but is not limited to appendicitis, cystitis, pyelonephritis, diverticulitis    ED Course     Labs Reviewed   BASIC METABOLIC PANEL (8) - Abnormal; Notable for the following components:       Result Value    Glucose 151 (*)     BUN 27 (*)     BUN/CREA Ratio 35.1 (*)     Calculated Osmolality 300 (*)     All other components within normal limits   CBC WITH DIFFERENTIAL WITH PLATELET - Abnormal; Notable for the following components:    WBC 15.7 (*)     Neutrophil Absolute Prelim 14.41 (*)     Neutrophil Absolute 14.41 (*)     Lymphocyte Absolute 0.67 (*)     All other components within normal limits   URINALYSIS WITH CULTURE REFLEX - Abnormal; Notable for the following components:    Glucose Urine 100 (*)     Ketones Urine 20 (*)     Leukocyte Esterase Urine 500 (*)     WBC Urine 21-50 (*)     Squamous Epi. Cells Few (*)     All other components within normal limits   URINE CULTURE, ROUTINE     EKG    Rate, intervals and axes as noted on EKG Report.  Rate: 102  Rhythm: Sinus Rhythm  Reading: No STEMI, no ectopy, no prior EKG available for comparison                CT ABDOMEN+PELVIS(CONTRAST ONLY)(CPT=74177)    Result Date: 2/8/2025  CONCLUSION:   Acute uncomplicated appendicitis.  Lesser incidental findings as above.    Dictated by (CST): Rob Fish MD on 2/08/2025 at 9:27 AM     Finalized by (CST): Rob Fish  MD HEIDI on 2/08/2025 at 9:32 AM                [facility]          Admission disposition: 2/8/2025  9:37 AM           Medical Decision Making    Vitals are stable in the ED.  BMP without acute pathology.  CBC shows leukocytosis and left shift.  Per my independent interpretation of CT abdomen pelvis there is concern for uncomplicated appendicitis.  Patient will be kept n.p.o., antibiotics initiated, notified Dr. Valencia for general surgery consultation.  Discussed with Dr. Last for admission.    Problems Addressed:  Acute appendicitis with localized peritonitis, without perforation, abscess, or gangrene: acute illness or injury with systemic symptoms    Amount and/or Complexity of Data Reviewed  Labs: ordered. Decision-making details documented in ED Course.  Radiology: ordered and independent interpretation performed. Decision-making details documented in ED Course.  Discussion of management or test interpretation with external provider(s): As above    Risk  Decision regarding hospitalization.      Disposition and Plan     Clinical Impression:  1. Acute appendicitis with localized peritonitis, without perforation, abscess, or gangrene         Disposition:  Admit  2/8/2025  9:37 am    Follow-up:  No follow-up provider specified.  We recommend that you schedule follow up care with a primary care provider within the next three months to obtain basic health screening including reassessment of your blood pressure.      Medications Prescribed:  Current Discharge Medication List              Supplementary Documentation:         Hospital Problems       Present on Admission  Date Reviewed: 1/24/2025            ICD-10-CM Noted POA    * (Principal) Acute appendicitis with localized peritonitis, without perforation, abscess, or gangrene K35.30 2/8/2025 Unknown

## 2025-02-08 NOTE — ANESTHESIA PREPROCEDURE EVALUATION
Anesthesia PreOp Note    HPI:     Talita Horan is a 74 year old female who presents for preoperative consultation requested by: Darwin Valencia MD    Date of Surgery: 2025    Procedure(s):  LAPAROSCOPIC POSSIBLE OPEN APPENDECTOMY  Indication: Acute appendicitis [K35.80]    Relevant Problems   No relevant active problems       NPO:                         History Review:  Patient Active Problem List    Diagnosis Date Noted    Acute appendicitis with localized peritonitis, without perforation, abscess, or gangrene 2025       Past Medical History:    Hyperlipidemia    UTI (urinary tract infection)       Past Surgical History:   Procedure Laterality Date    Colonoscopy      Knee surgery Right           Tonsillectomy         Prescriptions Prior to Admission[1]  Current Medications and Prescriptions Ordered in Epic[2]    Allergies[3]    Family History   Problem Relation Age of Onset    Cancer Mother     Breast Cancer Mother     Cancer Father     Breast Cancer Maternal Grandmother      Social History     Socioeconomic History    Marital status:    Tobacco Use    Smoking status: Never     Passive exposure: Never    Smokeless tobacco: Never   Vaping Use    Vaping status: Never Used   Substance and Sexual Activity    Alcohol use: Yes     Comment: very occasional    Drug use: Never       Available pre-op labs reviewed.  Lab Results   Component Value Date    WBC 15.7 (H) 2025    RBC 4.62 2025    HGB 14.4 2025    HCT 41.4 2025    MCV 89.6 2025    MCH 31.2 2025    MCHC 34.8 2025    RDW 11.6 2025    .0 2025     Lab Results   Component Value Date     2025    K 3.6 2025     2025    CO2 29.0 2025    BUN 27 (H) 2025    CREATSERUM 0.77 2025     (H) 2025    CA 9.4 2025          Vital Signs:  Body mass index is 22.14 kg/m².   height is 1.6 m (5' 3\") and weight is 56.7 kg (125  lb). Her oral temperature is 97.4 °F (36.3 °C). Her blood pressure is 123/56 and her pulse is 96. Her respiration is 17 and oxygen saturation is 98%.   Vitals:    02/08/25 0515 02/08/25 0715 02/08/25 0945   BP: 116/61 134/72 123/56   Pulse: 104 102 96   Resp: 20 14 17   Temp: 97.4 °F (36.3 °C)     TempSrc: Oral     SpO2: 98% 99% 98%   Weight: 56.7 kg (125 lb)     Height: 1.6 m (5' 3\")          Anesthesia Evaluation     Patient summary reviewed and Nursing notes reviewed    No history of anesthetic complications   Airway   Mallampati: I  TM distance: >3 FB  Neck ROM: full  Dental      Pulmonary - negative ROS and normal exam   Cardiovascular - normal exam    ROS comment: hld    Neuro/Psych - negative ROS     GI/Hepatic/Renal      Comments: appendicitis    Endo/Other    Abdominal                  Anesthesia Plan:   ASA:  3  Plan:   General  Informed Consent Plan and Risks Discussed With:  Patient      I have informed Talita Horan and/or legal guardian or family member of the nature of the anesthetic plan, benefits, risks including possible dental damage if relevant, major complications, and any alternative forms of anesthetic management.   All of the patient's questions were answered to the best of my ability. The patient desires the anesthetic management as planned.  Cary Calle MD  2/8/2025 10:59 AM  Present on Admission:  **None**           [1] (Not in a hospital admission)  [2]   Current Facility-Administered Medications Ordered in Epic   Medication Dose Route Frequency Provider Last Rate Last Admin    sodium chloride 0.9% infusion  125 mL/hr Intravenous Continuous Loren Piña  mL/hr at 02/08/25 0958 125 mL/hr at 02/08/25 0958    metroNIDAZOLE in sodium chloride 0.79% (Flagyl) 5 mg/mL IVPB premix 500 mg  500 mg Intravenous Once Loren Piña  mL/hr at 02/08/25 1029 500 mg at 02/08/25 1029     Current Outpatient Medications Ordered in Epic   Medication Sig Dispense Refill    triamcinolone  0.1 % External Cream Apply to affected area twice daily x 2 weeks. Stop for 2 weeks, repeat as needed 60 g 0    rosuvastatin 10 MG Oral Tab Take 1 tablet (10 mg total) by mouth nightly.      alendronate 70 MG Oral Tab Take 1 tablet (70 mg total) by mouth once a week.      Multiple Vitamins-Minerals (WOMENS 50+ MULTI VITAMIN/MIN) Oral Tab Take by mouth daily.     [3]   Allergies  Allergen Reactions    Penicillins RASH    Sulfa Antibiotics ITCHING and RASH

## 2025-02-08 NOTE — CONSULTS
[facility]    PATIENT'S NAME: LOPEZ PEREZ   ATTENDING PHYSICIAN: Loren Piña MD   CONSULTING PHYSICIAN: Darwin Valencia MD   PATIENT ACCOUNT#:   [id number]    LOCATION:  [address]  MEDICAL RECORD #:   [id number]       YOB: 1950  ADMISSION DATE:       02/08/2025      CONSULT DATE:  02/08/2025    REPORT OF CONSULTATION      REASON FOR CONSULTATION:  Acute appendicitis.    HISTORY OF PRESENT ILLNESS:  Patient is a pleasant 74-year-old female who is admitted with sharp lower abdominal pain, nausea, vomiting.  CT demonstrates acute appendicitis.  White count is elevated at 15.7.  She is slightly dehydrated.    PAST MEDICAL HISTORY:  Cognitive impairment, hyperlipidemia, uterine prolapse.    PAST SURGICAL HISTORY:  Knee surgery, tonsillectomy.    SOCIAL HISTORY:  .  Does not smoke.  Drinks occasionally.  No drug use.    PHYSICAL EXAMINATION:    GENERAL:  Uncomfortable but no distress.  Well developed, well nourished.  VITAL SIGNS:  Tachycardic, 104,  HEENT:  Normocephalic, atraumatic.  EOMI.  PERRLA.  NECK:  Supple without lymphadenopathy.  LUNGS:  Clear to auscultation.  No rhonchi or wheezing.  HEART:  Regular rate and rhythm.  No murmur, rubs, or gallops.  ABDOMEN:  Soft, tender right lower quadrant with localized guarding.    CT reviewed.    IMPRESSION:  Acute appendicitis.    PLAN:  Laparoscopic appendectomy.    I thank you for this consultation.    Dictated By Darwin Valencia MD  d: 02/08/2025 10:25:03  t: 02/08/2025 10:34:33  [facility] [id number]  /

## 2025-02-08 NOTE — ED QUICK NOTES
Orders for admission, patient is aware of plan and ready to go upstairs. Any questions, please call ED RN Bri at extension [phone].     Patient Covid vaccination status: Unvaccinated     COVID Test Ordered in ED: None    COVID Suspicion at Admission: N/A    Running Infusions:    sodium chloride 125 mL/hr (02/08/25 0958)        Mental Status/LOC at time of transport: a&ox4    Other pertinent information:   CIWA score: N/A   NIH score:  N/A

## 2025-02-08 NOTE — DISCHARGE INSTRUCTIONS
Ice pack as needed.  May shower in 24 hours.  Ibuprofen 600 mg every 6 hours for pain, Tylenol or Norco for breakthrough pain  15 pound lifting restriction for 4 weeks.  May drive in 1 week or sooner if pain-free  Remove outer dressing in 24 hours leave white strips for 1 week  Follow-up with PA in 2 weeks for wound check

## 2025-02-08 NOTE — ANESTHESIA POSTPROCEDURE EVALUATION
Patient: Talita Horan    Procedure Summary       Date: 02/08/25 Room / Location: [facility] MAIN OR 03 / EM MAIN OR    Anesthesia Start: 1144 Anesthesia Stop: 1223    Procedure: LAPAROSCOPIC  APPENDECTOMY (Abdomen) Diagnosis:       Acute appendicitis      (Acute appendicitis [K35.80])    Surgeons: Darwin Valencia MD Anesthesiologist: Cary Calle MD    Anesthesia Type: general ASA Status: 3            Anesthesia Type: general    Vitals Value Taken Time   /68 02/08/25 1223   Temp  02/08/25 1225   Pulse 121 02/08/25 1225   Resp 21 02/08/25 1225   SpO2 94 % 02/08/25 1225   Vitals shown include unfiled device data.    [facility] AN Post Evaluation:   Patient Evaluated in PACU  Patient Participation: complete - patient participated  Level of Consciousness: awake  Pain Management: adequate  Airway Patency:patent  Yes    Cardiovascular Status: acceptable  Respiratory Status: acceptable  Postoperative Hydration acceptable      Cary Calle MD  2/8/2025 12:25 PM

## 2025-02-08 NOTE — BRIEF OP NOTE
Pre-Operative Diagnosis: Acute appendicitis [K35.80]     Post-Operative Diagnosis: Acute appendicitis [K35.80]      Procedure Performed:   LAPAROSCOPIC  APPENDECTOMY    Surgeons and Role:     * Darwin Valencia MD - Primary    Assistant(s):  Surgical Assistant.: Magno Le, FERMIN; Eladia Watson     Surgical Findings: Acute appendicitis     Specimen: Appendix     Estimated Blood Loss:     Dictation Number:        Darwin Valencia MD  2/8/2025  12:11 PM

## 2025-02-09 VITALS
SYSTOLIC BLOOD PRESSURE: 120 MMHG | WEIGHT: 130 LBS | HEART RATE: 101 BPM | RESPIRATION RATE: 18 BRPM | HEIGHT: 63 IN | TEMPERATURE: 98 F | OXYGEN SATURATION: 96 % | DIASTOLIC BLOOD PRESSURE: 60 MMHG | BODY MASS INDEX: 23.04 KG/M2

## 2025-02-09 LAB
ANION GAP SERPL CALC-SCNC: 8 MMOL/L (ref 0–18)
BASOPHILS # BLD AUTO: 0.02 X10(3) UL (ref 0–0.2)
BASOPHILS NFR BLD AUTO: 0.2 %
BUN BLD-MCNC: 9 MG/DL (ref 9–23)
BUN/CREAT SERPL: 15 (ref 10–20)
CALCIUM BLD-MCNC: 8.1 MG/DL (ref 8.7–10.4)
CHLORIDE SERPL-SCNC: 111 MMOL/L (ref 98–112)
CO2 SERPL-SCNC: 26 MMOL/L (ref 21–32)
CREAT BLD-MCNC: 0.6 MG/DL
DEPRECATED RDW RBC AUTO: 41 FL (ref 35.1–46.3)
EGFRCR SERPLBLD CKD-EPI 2021: 95 ML/MIN/1.73M2 (ref 60–?)
EOSINOPHIL # BLD AUTO: 0.01 X10(3) UL (ref 0–0.7)
EOSINOPHIL NFR BLD AUTO: 0.1 %
ERYTHROCYTE [DISTWIDTH] IN BLOOD BY AUTOMATED COUNT: 12.1 % (ref 11–15)
GLUCOSE BLD-MCNC: 93 MG/DL (ref 70–99)
HCT VFR BLD AUTO: 36.1 %
HGB BLD-MCNC: 11.9 G/DL
IMM GRANULOCYTES # BLD AUTO: 0.04 X10(3) UL (ref 0–1)
IMM GRANULOCYTES NFR BLD: 0.3 %
LYMPHOCYTES # BLD AUTO: 1.25 X10(3) UL (ref 1–4)
LYMPHOCYTES NFR BLD AUTO: 10.1 %
MAGNESIUM SERPL-MCNC: 1.9 MG/DL (ref 1.6–2.6)
MCH RBC QN AUTO: 30.5 PG (ref 26–34)
MCHC RBC AUTO-ENTMCNC: 33 G/DL (ref 31–37)
MCV RBC AUTO: 92.6 FL
MONOCYTES # BLD AUTO: 0.58 X10(3) UL (ref 0.1–1)
MONOCYTES NFR BLD AUTO: 4.7 %
NEUTROPHILS # BLD AUTO: 10.44 X10 (3) UL (ref 1.5–7.7)
NEUTROPHILS # BLD AUTO: 10.44 X10(3) UL (ref 1.5–7.7)
NEUTROPHILS NFR BLD AUTO: 84.6 %
OSMOLALITY SERPL CALC.SUM OF ELEC: 298 MOSM/KG (ref 275–295)
PLATELET # BLD AUTO: 181 10(3)UL (ref 150–450)
POTASSIUM SERPL-SCNC: 3.4 MMOL/L (ref 3.5–5.1)
RBC # BLD AUTO: 3.9 X10(6)UL
SODIUM SERPL-SCNC: 145 MMOL/L (ref 136–145)
WBC # BLD AUTO: 12.3 X10(3) UL (ref 4–11)

## 2025-02-09 PROCEDURE — 99239 HOSP IP/OBS DSCHRG MGMT >30: CPT | Performed by: INTERNAL MEDICINE

## 2025-02-09 NOTE — PLAN OF CARE
A/O x 4. Tolerating low fiber diet. Voiding freely. Pain managed with norco. Lap sites clean/dry/intact. Walking SBA. Cleared for discharge by MD. Discharge instructions explained to patient. All medications reviewed, including which medications to start, continue, or stop taking. All follow up appointments reviewed. All discharge eduation explained to pt. Patient verbalized understanding, all questions answered. All belongings sent with patient.     Problem: Patient Centered Care  Goal: Patient preferences are identified and integrated in the patient's plan of care  Description: Interventions:  - What would you like us to know as we care for you?   - Provide timely, complete, and accurate information to patient/family  - Incorporate patient and family knowledge, values, beliefs, and cultural backgrounds into the planning and delivery of care  - Encourage patient/family to participate in care and decision-making at the level they choose  - Honor patient and family perspectives and choices  Outcome: Completed     Problem: Patient/Family Goals  Goal: Patient/Family Long Term Goal  Description: Patient's Long Term Goal: discharge home    Interventions:  - See additional Care Plan goals for specific interventions  Outcome: Completed  Goal: Patient/Family Short Term Goal  Description: Patient's Short Term Goal: pain control    Interventions:   - See additional Care Plan goals for specific interventions  Outcome: Completed     Problem: PAIN - ADULT  Goal: Verbalizes/displays adequate comfort level or patient's stated pain goal  Description: INTERVENTIONS:  - Encourage pt to monitor pain and request assistance  - Assess pain using appropriate pain scale  - Administer analgesics based on type and severity of pain and evaluate response  - Implement non-pharmacological measures as appropriate and evaluate response  - Consider cultural and social influences on pain and pain management  - Manage/alleviate anxiety  - Utilize  distraction and/or relaxation techniques  - Monitor for opioid side effects  - Notify MD/LIP if interventions unsuccessful or patient reports new pain  - Anticipate increased pain with activity and pre-medicate as appropriate  Outcome: Completed     Problem: SAFETY ADULT - FALL  Goal: Free from fall injury  Description: INTERVENTIONS:  - Assess pt frequently for physical needs  - Identify cognitive and physical deficits and behaviors that affect risk of falls.  - [place] fall precautions as indicated by assessment.  - Educate pt/family on patient safety including physical limitations  - Instruct pt to call for assistance with activity based on assessment  - Modify environment to reduce risk of injury  - Provide assistive devices as appropriate  - Consider OT/PT consult to assist with strengthening/mobility  - Encourage toileting schedule  Outcome: Completed     Problem: METABOLIC/FLUID AND ELECTROLYTES - ADULT  Goal: Electrolytes maintained within normal limits  Description: INTERVENTIONS:  - Monitor labs and rhythm and assess patient for signs and symptoms of electrolyte imbalances  - Administer electrolyte replacement as ordered  - Monitor response to electrolyte replacements, including rhythm and repeat lab results as appropriate  - Fluid restriction as ordered  - Instruct patient on fluid and nutrition restrictions as appropriate  Outcome: Completed     Problem: SKIN/TISSUE INTEGRITY - ADULT  Goal: Incision(s), wounds(s) or drain site(s) healing without S/S of infection  Description: INTERVENTIONS:  - Assess and document risk factors for pressure ulcer development  - Assess and document skin integrity  - Assess and document dressing/incision, wound bed, drain sites and surrounding tissue  - Implement wound care per orders  - Initiate isolation precautions as appropriate  - Initiate Pressure Ulcer prevention bundle as indicated  Outcome: Completed

## 2025-02-09 NOTE — PLAN OF CARE
Problem: Patient Centered Care  Goal: Patient preferences are identified and integrated in the patient's plan of care  Description: Interventions:  - What would you like us to know as we care for you?   - Provide timely, complete, and accurate information to patient/family  - Incorporate patient and family knowledge, values, beliefs, and cultural backgrounds into the planning and delivery of care  - Encourage patient/family to participate in care and decision-making at the level they choose  - Honor patient and family perspectives and choices  Outcome: Progressing     Problem: Patient/Family Goals  Goal: Patient/Family Long Term Goal  Description: Patient's Long Term Goal: discharge home    Interventions:  - See additional Care Plan goals for specific interventions  Outcome: Progressing  Goal: Patient/Family Short Term Goal  Description: Patient's Short Term Goal: pain control    Interventions:   - See additional Care Plan goals for specific interventions  Outcome: Progressing     Problem: PAIN - ADULT  Goal: Verbalizes/displays adequate comfort level or patient's stated pain goal  Description: INTERVENTIONS:  - Encourage pt to monitor pain and request assistance  - Assess pain using appropriate pain scale  - Administer analgesics based on type and severity of pain and evaluate response  - Implement non-pharmacological measures as appropriate and evaluate response  - Consider cultural and social influences on pain and pain management  - Manage/alleviate anxiety  - Utilize distraction and/or relaxation techniques  - Monitor for opioid side effects  - Notify MD/LIP if interventions unsuccessful or patient reports new pain  - Anticipate increased pain with activity and pre-medicate as appropriate  Outcome: Progressing     Problem: SAFETY ADULT - FALL  Goal: Free from fall injury  Description: INTERVENTIONS:  - Assess pt frequently for physical needs  - Identify cognitive and physical deficits and behaviors that  affect risk of falls.  - [place] fall precautions as indicated by assessment.  - Educate pt/family on patient safety including physical limitations  - Instruct pt to call for assistance with activity based on assessment  - Modify environment to reduce risk of injury  - Provide assistive devices as appropriate  - Consider OT/PT consult to assist with strengthening/mobility  - Encourage toileting schedule  Outcome: Progressing     Problem: METABOLIC/FLUID AND ELECTROLYTES - ADULT  Goal: Electrolytes maintained within normal limits  Description: INTERVENTIONS:  - Monitor labs and rhythm and assess patient for signs and symptoms of electrolyte imbalances  - Administer electrolyte replacement as ordered  - Monitor response to electrolyte replacements, including rhythm and repeat lab results as appropriate  - Fluid restriction as ordered  - Instruct patient on fluid and nutrition restrictions as appropriate  Outcome: Progressing     Problem: SKIN/TISSUE INTEGRITY - ADULT  Goal: Incision(s), wounds(s) or drain site(s) healing without S/S of infection  Description: INTERVENTIONS:  - Assess and document risk factors for pressure ulcer development  - Assess and document skin integrity  - Assess and document dressing/incision, wound bed, drain sites and surrounding tissue  - Implement wound care per orders  - Initiate isolation precautions as appropriate  - Initiate Pressure Ulcer prevention bundle as indicated  Outcome: Progressing

## 2025-02-09 NOTE — PROGRESS NOTES
[facility]  Progress Note    Talita Horan Patient Status:  Observation    1950 MRN [id number]   Location [facility] 4W/SW/SE Attending Valery Bautista MD   Hosp Day # 0 PCP Charley Dickerson MD     Subjective:  The patient was seen and examined at bedside. She reports incisional site abdominal pain. She has not eaten since surgery. She denies nausea or vomiting. She has not passed flatus or had a bowel movement.     Objective/Physical Exam:  General: Alert, orientated x3.  Cooperative.  No apparent distress.  Vital Signs:  Blood pressure 120/60, pulse 101, temperature 98.2 °F (36.8 °C), temperature source Oral, resp. rate 18, height 63\", weight 130 lb (59 kg), SpO2 96%.  Wt Readings from Last 3 Encounters:   25 130 lb (59 kg)   25 130 lb (59 kg)   12/10/24 131 lb (59.4 kg)     Lungs: No respiratory distress.  Cardiac: Regular rate and rhythm.   Abdomen:  Soft, non distended, incision sites tender, with no rebound or guarding.  No peritoneal signs.   Extremities:  No lower extremity edema noted.    Incision: Clean, dry, intact, no erythema    Intake/Output:    Intake/Output Summary (Last 24 hours) at 2025 0836  Last data filed at 2025 1826  Gross per 24 hour   Intake 200 ml   Output 1 ml   Net 199 ml     I/O last 3 completed shifts:  In: 200 [I.V.:100; IV PIGGYBACK:100]  Out: 1 [Urine:1]  No intake/output data recorded.    Medications:    heparin  5,000 Units Subcutaneous Q8H JUNAID       Labs:     Lab Results   Component Value Date     2025    K 3.4 2025     2025    CO2 26.0 2025    BUN 9 2025    CREATSERUM 0.60 2025    GLU 93 2025    CA 8.1 2025     No results found for: \"PT\", \"INR\"      Assessment  Patient Active Problem List   Diagnosis    Acute appendicitis with localized peritonitis, without perforation, abscess, or gangrene     POD 1 laparoscopic appendectomy    Plan:  Overall the patient is doing  well post-operatively, she may discharge home from a general surgical standpoint today  Diet as tolerated  Antiemetics and analgesics as needed  Encourage incentive spirometer  Ambulate and up to chair  DVT prophylaxis with heparin  Follow up with [facility] surgery in 2 weeks.     Quality:  DVT Mechanical Prophylaxis:   SCDs,    DVT Pharmacologic Prophylaxis   Medication    heparin (Porcine) 5000 UNIT/ML injection 5,000 Units                Code Status: Not on file  [place]: No urinary catheter in place  [place] Duration (in days):   Central line:    RONY: 2/9/2025        Kathryn Salter PA-C  2/9/2025  8:36 AM

## 2025-02-09 NOTE — PLAN OF CARE
Patient alert and oriented X4, vitals stable. Morphine given for pain. Continues with IVFs. Up to bathroom throughout the night with standby assist. Incisional dressings intact. Tolerating diet, denies nausea and vomiting. Voiding freely. States has not passed gas. Bed locked and in lowest position, call light within reach, calls appropriately for assistance.    Problem: Patient Centered Care  Goal: Patient preferences are identified and integrated in the patient's plan of care  Description: Interventions:  - What would you like us to know as we care for you?   - Provide timely, complete, and accurate information to patient/family  - Incorporate patient and family knowledge, values, beliefs, and cultural backgrounds into the planning and delivery of care  - Encourage patient/family to participate in care and decision-making at the level they choose  - Honor patient and family perspectives and choices  Outcome: Progressing     Problem: Patient/Family Goals  Goal: Patient/Family Long Term Goal  Description: Patient's Long Term Goal: discharge home    Interventions:  - See additional Care Plan goals for specific interventions  Outcome: Progressing  Goal: Patient/Family Short Term Goal  Description: Patient's Short Term Goal: pain control    Interventions:   - See additional Care Plan goals for specific interventions  Outcome: Progressing     Problem: PAIN - ADULT  Goal: Verbalizes/displays adequate comfort level or patient's stated pain goal  Description: INTERVENTIONS:  - Encourage pt to monitor pain and request assistance  - Assess pain using appropriate pain scale  - Administer analgesics based on type and severity of pain and evaluate response  - Implement non-pharmacological measures as appropriate and evaluate response  - Consider cultural and social influences on pain and pain management  - Manage/alleviate anxiety  - Utilize distraction and/or relaxation techniques  - Monitor for opioid side effects  - Notify  MD/LIP if interventions unsuccessful or patient reports new pain  - Anticipate increased pain with activity and pre-medicate as appropriate  Outcome: Progressing     Problem: SAFETY ADULT - FALL  Goal: Free from fall injury  Description: INTERVENTIONS:  - Assess pt frequently for physical needs  - Identify cognitive and physical deficits and behaviors that affect risk of falls.  - [place] fall precautions as indicated by assessment.  - Educate pt/family on patient safety including physical limitations  - Instruct pt to call for assistance with activity based on assessment  - Modify environment to reduce risk of injury  - Provide assistive devices as appropriate  - Consider OT/PT consult to assist with strengthening/mobility  - Encourage toileting schedule  Outcome: Progressing     Problem: METABOLIC/FLUID AND ELECTROLYTES - ADULT  Goal: Electrolytes maintained within normal limits  Description: INTERVENTIONS:  - Monitor labs and rhythm and assess patient for signs and symptoms of electrolyte imbalances  - Administer electrolyte replacement as ordered  - Monitor response to electrolyte replacements, including rhythm and repeat lab results as appropriate  - Fluid restriction as ordered  - Instruct patient on fluid and nutrition restrictions as appropriate  Outcome: Progressing     Problem: SKIN/TISSUE INTEGRITY - ADULT  Goal: Incision(s), wounds(s) or drain site(s) healing without S/S of infection  Description: INTERVENTIONS:  - Assess and document risk factors for pressure ulcer development  - Assess and document skin integrity  - Assess and document dressing/incision, wound bed, drain sites and surrounding tissue  - Implement wound care per orders  - Initiate isolation precautions as appropriate  - Initiate Pressure Ulcer prevention bundle as indicated  Outcome: Progressing

## 2025-02-09 NOTE — DISCHARGE SUMMARY
[facility]  part of [facility]    DISCHARGE SUMMARY     Talita Horan Patient Status:  Observation    1950 MRN [id number]   Location [facility] 4W/SW/SE Attending Valery Bautista MD   Hosp Day # 0 PCP Charley Dickerson MD     Date of Admission: 2025  Date of Discharge:  2025    Discharge Disposition: Final discharge disposition not confirmed    Discharge Diagnosis:     Acute appendicitis s/p lap appendectomy  HLD    History of Present Illness:     Talita Horan is a 74 year old female with a past medical history of uterine prolapse, mild cognitive impairment and HLD presented ot the ER with complaints of abdominal pain that began last night. The pain is sharp, non-radiating, in the RLQ and associated with nausea and vomiting.   She denied any other complaints at the time of interview.    Brief Synopsis:     Patient underwent a lap appendectomy and tolerated the surgery well. She reports mild soreness over the surgical site but denied any nausea or vomiting. She was able to keep her food down last night and is looking forward to breakfast this morning.   She denied any abdominal pain or any other complaints.   The patient will follow up with PCP and Surgery as opt for further care.   Discharge meds ordered by the surgeon.     Patient is to remain compliant with all discharge medications and instructions and to follow up as advised.   Patient encouraged to make healthy lifestyle and dietary changes.    Lace+ Score: 40  59-90 High Risk  29-58 Medium Risk  0-28   Low Risk       TCM Follow-Up Recommendation:  LACE 29-58: Moderate Risk of readmission after discharge from the hospital.      Procedures during hospitalization:   Lap appendectomy    Incidental or significant findings and recommendations (brief descriptions):  None    Lab/Test results pending at Discharge:   None    Consultants:  Consultants         Provider   Role Specialty     Valery Bautista MD       Consulting Physician HOSPITALIST     Dwayne Leonardo MD      Consulting Physician HOSPITALIST     Darwin Valencia MD      Consulting Physician SURGERY, GENERAL            Discharge Medication List:     Discharge Medications        START taking these medications        Instructions Prescription details   docusate sodium 100 MG Caps  Commonly known as: Colace      Take 1 capsule (100 mg total) by mouth 2 (two) times daily.   Quantity: 30 capsule  Refills: 0     HYDROcodone-acetaminophen 5-325 MG Tabs  Commonly known as: Norco      Take 1 tablet by mouth every 6 (six) hours as needed.   Quantity: 10 tablet  Refills: 0     ibuprofen 600 MG Tabs  Commonly known as: Motrin      Take 1 tablet (600 mg total) by mouth every 6 (six) hours as needed for Pain.   Stop taking on: February 15, 2025  Quantity: 15 tablet  Refills: 1            CONTINUE taking these medications        Instructions Prescription details   alendronate 70 MG Tabs  Commonly known as: Fosamax      Take 1 tablet (70 mg total) by mouth once a week.   Refills: 0     rosuvastatin 10 MG Tabs  Commonly known as: Crestor      Take 1 tablet (10 mg total) by mouth nightly.   Refills: 0     triamcinolone 0.1 % Crea  Commonly known as: Kenalog      Apply to affected area twice daily x 2 weeks. Stop for 2 weeks, repeat as needed   Quantity: 60 g  Refills: 0     Womens 50+ Multi Vitamin/Min Tabs      Take by mouth daily.   Refills: 0            STOP taking these medications      ciprofloxacin 500 MG Tabs  Commonly known as: Cipro                  Where to Get Your Medications        These medications were [id number] [place] [facility] [address] AT [facility] [place], operated [facility], [phone], [phone]  [address] [facility], [facility] [address]      Hours: 24-hours Phone: [phone]   docusate sodium 100 MG Caps  HYDROcodone-acetaminophen 5-325 MG Tabs  ibuprofen 600 MG Tabs         [place]    Follow-up appointment:   [facility]  [address] [facility]  [address]  [facility] [id number] [phone]    Follow up in 2 week(s)  For wound re-check    Charley Dickerson MD  [address] [facility] [id number] [phone]    Follow up in 1 week(s)      Appointments for Next 30 Days 2/9/2025 - 3/11/2025      None            Vital signs:  Temp:  [97.8 °F (36.6 °C)-98.3 °F (36.8 °C)] 98.2 °F (36.8 °C)  Pulse:  [] 101  Resp:  [9-19] 18  BP: (105-134)/(55-76) 120/60  SpO2:  [93 %-100 %] 96 %    Physical Exam:    Gen: NAD AO x3  Chest: good air entry CTABL  CVS: normal s1 and s2 RR  Abd: NABS soft NT ND  Neuro: CN 2-12 grossly intact  Ext: no edema in bilateral LE    -----------------------------------------------------------------------------------------------  PATIENT DISCHARGE INSTRUCTIONS: See electronic chart    Valery Bautista MD  Hospitalist    Time spent:  > 30 minutes    The 21st Century Cures Act makes medical notes like these available to patients in the interest of transparency. Please be advised this is a medical document. Medical documents are intended to carry relevant information, facts as evident, and the clinical opinion of the practitioner. The medical note is intended as peer to peer communication and may appear blunt or direct. It is written in medical language and may contain abbreviations or verbiage that are unfamiliar.

## 2025-02-10 NOTE — OPERATIVE REPORT
[facility]    PATIENT'S NAME: LOPEZ PEREZ   ATTENDING PHYSICIAN: Valery Bautista MD   OPERATING PHYSICIAN: Darwin Valencia MD   PATIENT ACCOUNT#:   [id number]    LOCATION:  [address] [place] [address]  MEDICAL RECORD #:   [id number]       YOB: 1950  ADMISSION DATE:       02/08/2025      OPERATION DATE:  02/08/2025    OPERATIVE REPORT    PREOPERATIVE DIAGNOSIS:  Acute appendicitis.  POSTOPERATIVE DIAGNOSIS:  Acute appendicitis.  PROCEDURE:  Laparoscopic appendectomy.    ASSISTANT:  MALKA Suarez    ESTIMATED BLOOD LOSS:  2 mL.    COMPLICATIONS:  None.    ANESTHESIA:  General.    DISPOSITION:  To Recovery, tolerated well.    INDICATIONS:  The patient is 74 with the above complaint.  Consent obtained.    OPERATIVE TECHNIQUE:  Patient was taken to surgery.  She was prepped and draped in the usual sterile fashion.  Veress needle placed at Vanessa point.  Abdomen insufflated.  A 5 mm trocar was placed using Optiview.  Two additional trocars were placed under laparoscopic guidance.  Exploration revealed fibrinous exudate around the appendix in the right lower quadrant.  It is grasped, the mesoappendix taken down using LigaSure to the base where it joins with the cecum.  This was transected with a tan load Endo-LENA.  The appendix retrieved using an Endobag.  The right lower quadrant irrigated.  Hemostasis was verified.  Trocars were removed.  Fascia closed with 0 Vicryl.  Skin closed with 3-0 Monocryl.  Marcaine infiltrated for local block.    Dictated By Darwin Valencia MD  d: 02/08/2025 12:15:06  t: 02/08/2025 21:04:44  [facility] [id number]  GL/    cc: Dr. Charley Bautista MD

## 2025-02-11 ENCOUNTER — TELEPHONE (OUTPATIENT)
Dept: FAMILY MEDICINE CLINIC | Facility: CLINIC | Age: 75
End: 2025-02-11

## 2025-02-11 ENCOUNTER — PATIENT MESSAGE (OUTPATIENT)
Dept: FAMILY MEDICINE CLINIC | Facility: CLINIC | Age: 75
End: 2025-02-11

## 2025-02-11 ENCOUNTER — PATIENT OUTREACH (OUTPATIENT)
Dept: CASE MANAGEMENT | Age: 75
End: 2025-02-11

## 2025-02-11 NOTE — TELEPHONE ENCOUNTER
Patient requesting hospital follow-up appointment via . Schedule is not available until after 4/7.     Please contact patient to schedule this appointment. Phone/[phone].

## 2025-02-11 NOTE — PROGRESS NOTES
Per TCM message :      (Discharged 2/09 [facility])        PCP Request :    Charley Dickerson MD  [address] [facility] [address] [phone]  Follow up in 1 week(s)  Friday 2/14 @8:20am w/ Renetta Montoya APRN    [facility]  [address] [facility] [address]  [facility] [id number] [phone]  Follow up in 2 week  Wednesday 2/26 @9:15am w/ Gen Surg [facility]      Appts scheduled & confirmed      Closing encounter

## 2025-02-14 ENCOUNTER — OFFICE VISIT (OUTPATIENT)
Dept: FAMILY MEDICINE CLINIC | Facility: CLINIC | Age: 75
End: 2025-02-14
Payer: MEDICARE

## 2025-02-14 VITALS
DIASTOLIC BLOOD PRESSURE: 72 MMHG | WEIGHT: 128 LBS | BODY MASS INDEX: 22.68 KG/M2 | TEMPERATURE: 97 F | HEIGHT: 63 IN | SYSTOLIC BLOOD PRESSURE: 121 MMHG | HEART RATE: 109 BPM

## 2025-02-14 DIAGNOSIS — Z90.49 S/P LAPAROSCOPIC APPENDECTOMY: ICD-10-CM

## 2025-02-14 DIAGNOSIS — L29.9 PRURITUS: Primary | ICD-10-CM

## 2025-02-14 DIAGNOSIS — G31.84 AMNESTIC MCI (MILD COGNITIVE IMPAIRMENT WITH MEMORY LOSS): ICD-10-CM

## 2025-02-14 PROCEDURE — 1111F DSCHRG MED/CURRENT MED MERGE: CPT | Performed by: STUDENT IN AN ORGANIZED HEALTH CARE EDUCATION/TRAINING PROGRAM

## 2025-02-14 PROCEDURE — G2211 COMPLEX E/M VISIT ADD ON: HCPCS | Performed by: STUDENT IN AN ORGANIZED HEALTH CARE EDUCATION/TRAINING PROGRAM

## 2025-02-14 PROCEDURE — 99214 OFFICE O/P EST MOD 30 MIN: CPT | Performed by: STUDENT IN AN ORGANIZED HEALTH CARE EDUCATION/TRAINING PROGRAM

## 2025-02-14 NOTE — PROGRESS NOTES
HPI:    Patient ID: Talita Horan is a 74 year old female.    HPI  Pt presenting for follow-up.  present for visit.    S/p lap appy 2/8/2025 following onset of nausea/vomiting and RLQ pain night prior  Reports doing better overall  Notes some discomfort with movement, getting up from chair 2/10 pain  Mild nausea, related to prior orthostatic hypotension  Has been trying to increase fluid intake  Appetite at baseline  Inconsistent BM -- at baseline  Denies fever, vomiting    Reports ongoing pruritus for last approx 6 months  No new exposures, foods, detergents  Reports emollient application with minimal relief  Excoriations on arm due to itching  Areas can travel  No other affected family contacts  Dec 2024 Triamcinolone with temporary relief    Seen by [facility] Jan 2025  Dx amnesia MCI  Completed genetic testing -- not ideal candidate for treatment options      Review of Systems   A comprehensive 10 point review of systems was completed.  Pertinent positives and negatives noted in the the HPI.       Current Outpatient Medications   Medication Sig Dispense Refill    HYDROcodone-acetaminophen 5-325 MG Oral Tab Take 1 tablet by mouth every 6 (six) hours as needed. 10 tablet 0    triamcinolone 0.1 % External Cream Apply to affected area twice daily x 2 weeks. Stop for 2 weeks, repeat as needed 60 g 0    rosuvastatin 10 MG Oral Tab Take 1 tablet (10 mg total) by mouth nightly.      alendronate 70 MG Oral Tab Take 1 tablet (70 mg total) by mouth once a week.      Multiple Vitamins-Minerals (WOMENS 50+ MULTI VITAMIN/MIN) Oral Tab Take by mouth daily.      docusate sodium 100 MG Oral Cap Take 1 capsule (100 mg total) by mouth 2 (two) times daily. (Patient not taking: Reported on 2/14/2025) 30 capsule 0     Allergies:Allergies[1]   Vitals:    02/14/25 0945   BP: 121/72   Pulse: 109   Temp: 97.2 °F (36.2 °C)   TempSrc: Temporal   Weight: 128 lb (58.1 kg)   Height: 5' 3\" (1.6 m)       Body mass index is  22.67 kg/m².   PHYSICAL EXAM:   Physical Exam  Vitals reviewed.   Constitutional:       General: She is not in acute distress.  HENT:      Head: Normocephalic.   Eyes:      Conjunctiva/sclera: Conjunctivae normal.   Cardiovascular:      Rate and Rhythm: Normal rate and regular rhythm.      Pulses: Normal pulses.      Heart sounds: Normal heart sounds.   Pulmonary:      Effort: Pulmonary effort is normal. No respiratory distress.      Breath sounds: Normal breath sounds.   Abdominal:      General: Bowel sounds are normal.      Palpations: Abdomen is soft.      Tenderness: There is no abdominal tenderness. There is no right CVA tenderness, left CVA tenderness, guarding or rebound.          Comments: Incisions c/d/I with steristrips and localized bruising   Musculoskeletal:      Cervical back: No tenderness.      Right lower leg: No edema.      Left lower leg: No edema.   Lymphadenopathy:      Cervical: No cervical adenopathy.   Skin:     Comments: Excoriations on Left proximal arm, dry skin on BUE without macules or other lesions   Neurological:      Mental Status: She is alert and oriented to person, place, and time. Mental status is at baseline.   Psychiatric:         Mood and Affect: Mood normal.         Behavior: Behavior normal.             ASSESSMENT/PLAN:   1. Pruritus  Discussed treatment options  Limited due to medical history  - encouraged frequent emollient application -- counseled on urea components  - to apply topical steroid to affected area as needed  - avoid prolonged sun exposure  - avoid other irritants  - increase hydration and rest as tolerated  - to call with any questions/concerns    2. 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    No orders of the defined types  were placed in this encounter.      Meds This Visit:  Requested Prescriptions      No prescriptions requested or ordered in this encounter       Imaging & Referrals:  None         ID#[id number]         [1]   Allergies  Allergen Reactions    Penicillins RASH    Sulfa Antibiotics ITCHING and RASH

## 2025-02-26 ENCOUNTER — NURSE ONLY (OUTPATIENT)
Dept: SURGERY | Facility: CLINIC | Age: 75
End: 2025-02-26
Payer: MEDICARE

## 2025-02-26 VITALS — DIASTOLIC BLOOD PRESSURE: 49 MMHG | SYSTOLIC BLOOD PRESSURE: 98 MMHG | HEART RATE: 90 BPM

## 2025-02-26 DIAGNOSIS — Z48.89 ENCOUNTER FOR POSTOPERATIVE CARE: Primary | ICD-10-CM

## 2025-02-26 PROCEDURE — 1111F DSCHRG MED/CURRENT MED MERGE: CPT

## 2025-02-26 PROCEDURE — 1126F AMNT PAIN NOTED NONE PRSNT: CPT

## 2025-02-26 PROCEDURE — 1159F MED LIST DOCD IN RCRD: CPT

## 2025-02-26 PROCEDURE — 99024 POSTOP FOLLOW-UP VISIT: CPT

## 2025-02-26 PROCEDURE — 1160F RVW MEDS BY RX/DR IN RCRD: CPT

## 2025-02-26 NOTE — PROGRESS NOTES
S:  Talita Horan is a 74 year old female sp Lap Appy  Doing well, no fevers, no chills, tolerating a general diet, generally normal bowel movements, no calf tenderness nor lower extremity edema, no shortness of breath, no chest pain.       O:  BP 98/49   Pulse 90   GEN:  No acute distress  L: nonlabored respirations  H: reg rate  Abd:  Soft, NT,ND, incisions C/D/I, no erythema.  Extr: No edema, no calf tenderness    Path:  Reviewed w pt    Assessment   1. Encounter for postoperative care        Doing well sp Lap Appy.  Continue to avoid heavy lifting for another few weeks.  Maintain a healthy diet.  Maintain good hydration.  F/u prn.         Rob Hemphill PA-C

## 2025-06-09 ENCOUNTER — PATIENT MESSAGE (OUTPATIENT)
Dept: FAMILY MEDICINE CLINIC | Facility: CLINIC | Age: 75
End: 2025-06-09

## 2025-06-09 DIAGNOSIS — N95.0 POST-MENOPAUSE BLEEDING: Primary | ICD-10-CM

## 2025-06-10 NOTE — TELEPHONE ENCOUNTER
DR Dickerson =see message, do you prefer  a gynecologist ?     Please respond directly to the patient if no additional staff support is required.      No future appointments.

## 2025-07-19 ENCOUNTER — HOSPITAL ENCOUNTER (INPATIENT)
Facility: HOSPITAL | Age: 75
LOS: 4 days | Discharge: HOME | End: 2025-07-23
Attending: STUDENT IN AN ORGANIZED HEALTH CARE EDUCATION/TRAINING PROGRAM | Admitting: HOSPITALIST

## 2025-07-19 ENCOUNTER — APPOINTMENT (OUTPATIENT)
Dept: CT IMAGING | Facility: HOSPITAL | Age: 75
End: 2025-07-19
Attending: STUDENT IN AN ORGANIZED HEALTH CARE EDUCATION/TRAINING PROGRAM

## 2025-07-19 ENCOUNTER — APPOINTMENT (OUTPATIENT)
Dept: GENERAL RADIOLOGY | Facility: HOSPITAL | Age: 75
End: 2025-07-19
Attending: STUDENT IN AN ORGANIZED HEALTH CARE EDUCATION/TRAINING PROGRAM

## 2025-07-19 ENCOUNTER — APPOINTMENT (OUTPATIENT)
Dept: ULTRASOUND IMAGING | Facility: HOSPITAL | Age: 75
End: 2025-07-19
Attending: NURSE PRACTITIONER

## 2025-07-19 ENCOUNTER — HOSPITAL ENCOUNTER (INPATIENT)
Facility: HOSPITAL | Age: 75
LOS: 4 days | Discharge: HOME HEALTH | End: 2025-07-23
Attending: STUDENT IN AN ORGANIZED HEALTH CARE EDUCATION/TRAINING PROGRAM | Admitting: HOSPITALIST

## 2025-07-19 DIAGNOSIS — E86.1 HYPOTENSION DUE TO HYPOVOLEMIA: ICD-10-CM

## 2025-07-19 DIAGNOSIS — N17.9 AKI (ACUTE KIDNEY INJURY): Primary | ICD-10-CM

## 2025-07-19 DIAGNOSIS — N30.01 ACUTE CYSTITIS WITH HEMATURIA: ICD-10-CM

## 2025-07-19 PROBLEM — R57.1 HYPOVOLEMIC SHOCK (HCC): Status: ACTIVE | Noted: 2025-07-19

## 2025-07-19 PROBLEM — R19.7 NAUSEA VOMITING AND DIARRHEA: Status: ACTIVE | Noted: 2025-07-19

## 2025-07-19 PROBLEM — R11.2 NAUSEA VOMITING AND DIARRHEA: Status: ACTIVE | Noted: 2025-07-19

## 2025-07-19 PROBLEM — E86.0 DEHYDRATION: Status: ACTIVE | Noted: 2025-07-19

## 2025-07-19 LAB
ADENOVIRUS PCR:: NOT DETECTED
ALBUMIN SERPL-MCNC: 3.3 G/DL (ref 3.2–4.8)
ALBUMIN/GLOB SERPL: 1.4 (ref 1–2)
ALP LIVER SERPL-CCNC: 171 U/L (ref 55–142)
ALT SERPL-CCNC: 33 U/L (ref 10–49)
ANION GAP SERPL CALC-SCNC: 12 MMOL/L (ref 0–18)
AST SERPL-CCNC: 21 U/L (ref ?–34)
B PARAPERT DNA SPEC QL NAA+PROBE: NOT DETECTED
B PERT DNA SPEC QL NAA+PROBE: NOT DETECTED
BASOPHILS # BLD AUTO: 0.04 X10(3) UL (ref 0–0.2)
BASOPHILS NFR BLD AUTO: 0.2 %
BILIRUB SERPL-MCNC: 4 MG/DL (ref 0.2–1.1)
BILIRUB UR QL CFM: POSITIVE
BUN BLD-MCNC: 61 MG/DL (ref 9–23)
BUN/CREAT SERPL: 22.7 (ref 10–20)
C PNEUM DNA SPEC QL NAA+PROBE: NOT DETECTED
CALCIUM BLD-MCNC: 8.9 MG/DL (ref 8.7–10.4)
CHLORIDE SERPL-SCNC: 96 MMOL/L (ref 98–112)
CO2 SERPL-SCNC: 21 MMOL/L (ref 21–32)
CORONAVIRUS 229E PCR:: NOT DETECTED
CORONAVIRUS HKU1 PCR:: NOT DETECTED
CORONAVIRUS NL63 PCR:: NOT DETECTED
CORONAVIRUS OC43 PCR:: NOT DETECTED
CREAT BLD-MCNC: 2.69 MG/DL (ref 0.55–1.02)
DEPRECATED RDW RBC AUTO: 38.8 FL (ref 35.1–46.3)
EGFRCR SERPLBLD CKD-EPI 2021: 18 ML/MIN/1.73M2 (ref 60–?)
EOSINOPHIL # BLD AUTO: 0.49 X10(3) UL (ref 0–0.7)
EOSINOPHIL NFR BLD AUTO: 2 %
ERYTHROCYTE [DISTWIDTH] IN BLOOD BY AUTOMATED COUNT: 12.6 % (ref 11–15)
FLUAV RNA SPEC QL NAA+PROBE: NOT DETECTED
FLUBV RNA SPEC QL NAA+PROBE: NOT DETECTED
GLOBULIN PLAS-MCNC: 2.4 G/DL (ref 2–3.5)
GLUCOSE BLD-MCNC: 132 MG/DL (ref 70–99)
GLUCOSE UR-MCNC: 50 MG/DL
HCT VFR BLD AUTO: 39.8 % (ref 35–48)
HGB BLD-MCNC: 14.1 G/DL (ref 12–16)
IMM GRANULOCYTES # BLD AUTO: 0.23 X10(3) UL (ref 0–1)
IMM GRANULOCYTES NFR BLD: 0.9 %
KETONES UR-MCNC: NEGATIVE MG/DL
L PNEUMO AG UR QL: NEGATIVE
LACTATE SERPL-SCNC: 2.3 MMOL/L (ref 0.5–2)
LACTATE SERPL-SCNC: 2.3 MMOL/L (ref 0.5–2)
LACTATE SERPL-SCNC: 3.1 MMOL/L (ref 0.5–2)
LEUKOCYTE ESTERASE UR QL STRIP.AUTO: 250
LYMPHOCYTES # BLD AUTO: 2.44 X10(3) UL (ref 1–4)
LYMPHOCYTES NFR BLD AUTO: 10.1 %
MAGNESIUM SERPL-MCNC: 2.1 MG/DL (ref 1.6–2.6)
MCH RBC QN AUTO: 29.7 PG (ref 26–34)
MCHC RBC AUTO-ENTMCNC: 35.4 G/DL (ref 31–37)
MCV RBC AUTO: 84 FL (ref 80–100)
METAPNEUMOVIRUS PCR:: NOT DETECTED
MONOCYTES # BLD AUTO: 0.66 X10(3) UL (ref 0.1–1)
MONOCYTES NFR BLD AUTO: 2.7 %
MYCOPLASMA PNEUMONIA PCR:: NOT DETECTED
NEUTROPHILS # BLD AUTO: 20.41 X10 (3) UL (ref 1.5–7.7)
NEUTROPHILS # BLD AUTO: 20.41 X10(3) UL (ref 1.5–7.7)
NEUTROPHILS NFR BLD AUTO: 84.1 %
NITRITE UR QL STRIP.AUTO: NEGATIVE
OSMOLALITY SERPL CALC.SUM OF ELEC: 287 MOSM/KG (ref 275–295)
PARAINFLUENZA 1 PCR:: NOT DETECTED
PARAINFLUENZA 2 PCR:: NOT DETECTED
PARAINFLUENZA 3 PCR:: NOT DETECTED
PARAINFLUENZA 4 PCR:: NOT DETECTED
PH UR: 6 (ref 5–8)
PLATELET # BLD AUTO: 205 10(3)UL (ref 150–450)
PLATELET MORPHOLOGY: NORMAL
POTASSIUM SERPL-SCNC: 3.1 MMOL/L (ref 3.5–5.1)
PROT SERPL-MCNC: 5.7 G/DL (ref 5.7–8.2)
PROT UR-MCNC: 100 MG/DL
RBC # BLD AUTO: 4.74 X10(6)UL (ref 3.8–5.3)
RHINOVIRUS/ENTERO PCR:: NOT DETECTED
RSV RNA SPEC QL NAA+PROBE: NOT DETECTED
SARS-COV-2 RNA NPH QL NAA+NON-PROBE: NOT DETECTED
SODIUM SERPL-SCNC: 129 MMOL/L (ref 136–145)
SP GR UR STRIP: 1.01 (ref 1–1.03)
STREP PNEUMO ANTIGEN, URINE: NEGATIVE
UROBILINOGEN UR STRIP-ACNC: NORMAL
WBC # BLD AUTO: 24.3 X10(3) UL (ref 4–11)
WBC #/AREA URNS AUTO: >50 /HPF
WBC CLUMPS UR QL AUTO: PRESENT /HPF

## 2025-07-19 PROCEDURE — 76705 ECHO EXAM OF ABDOMEN: CPT | Performed by: STUDENT IN AN ORGANIZED HEALTH CARE EDUCATION/TRAINING PROGRAM

## 2025-07-19 PROCEDURE — 99223 1ST HOSP IP/OBS HIGH 75: CPT | Performed by: HOSPITALIST

## 2025-07-19 PROCEDURE — 71045 X-RAY EXAM CHEST 1 VIEW: CPT | Performed by: STUDENT IN AN ORGANIZED HEALTH CARE EDUCATION/TRAINING PROGRAM

## 2025-07-19 PROCEDURE — 99291 CRITICAL CARE FIRST HOUR: CPT | Performed by: INTERNAL MEDICINE

## 2025-07-19 PROCEDURE — 74176 CT ABD & PELVIS W/O CONTRAST: CPT | Performed by: STUDENT IN AN ORGANIZED HEALTH CARE EDUCATION/TRAINING PROGRAM

## 2025-07-19 PROCEDURE — 3E033XZ INTRODUCTION OF VASOPRESSOR INTO PERIPHERAL VEIN, PERCUTANEOUS APPROACH: ICD-10-PCS | Performed by: STUDENT IN AN ORGANIZED HEALTH CARE EDUCATION/TRAINING PROGRAM

## 2025-07-19 RX ORDER — HEPARIN SODIUM 5000 [USP'U]/ML
5000 INJECTION, SOLUTION INTRAVENOUS; SUBCUTANEOUS EVERY 12 HOURS SCHEDULED
Status: DISCONTINUED | OUTPATIENT
Start: 2025-07-19 | End: 2025-07-23

## 2025-07-19 RX ORDER — POTASSIUM CHLORIDE 1500 MG/1
40 TABLET, EXTENDED RELEASE ORAL EVERY 4 HOURS
Status: COMPLETED | OUTPATIENT
Start: 2025-07-19 | End: 2025-07-20

## 2025-07-19 RX ORDER — ACETAMINOPHEN 500 MG
1000 TABLET ORAL EVERY 6 HOURS PRN
Status: DISCONTINUED | OUTPATIENT
Start: 2025-07-19 | End: 2025-07-23

## 2025-07-19 RX ORDER — SODIUM CHLORIDE 9 MG/ML
INJECTION, SOLUTION INTRAVENOUS CONTINUOUS
Status: DISCONTINUED | OUTPATIENT
Start: 2025-07-19 | End: 2025-07-22

## 2025-07-19 RX ORDER — ONDANSETRON 2 MG/ML
4 INJECTION INTRAMUSCULAR; INTRAVENOUS EVERY 6 HOURS PRN
Status: DISCONTINUED | OUTPATIENT
Start: 2025-07-19 | End: 2025-07-23

## 2025-07-19 RX ORDER — ONDANSETRON 2 MG/ML
4 INJECTION INTRAMUSCULAR; INTRAVENOUS ONCE
Status: COMPLETED | OUTPATIENT
Start: 2025-07-19 | End: 2025-07-19

## 2025-07-19 NOTE — ED QUICK NOTES
Orders for admission, patient is aware of plan and ready to go upstairs. Any questions, please call ED RN Qing at extension [phone].     Patient Covid vaccination status: Fully vaccinated     COVID Test Ordered in ED: None    COVID Suspicion at Admission: N/A    Running Infusions: Medication Infusions[1]     Mental Status/LOC at time of transport: AOx3-4, hx of dementia    Other pertinent information: from home with , up with assistance  CIWA score: N/A   NIH score:  N/A             [1]

## 2025-07-19 NOTE — H&P
[facility]  part of [facility]    History & Physical    Talita Horan Patient Status:  Inpatient    1950 MRN [id number]   Location [facility] 2W/SW Attending Leandro Higgins MD   Hosp Day # 0 PCP Charley Dickerson MD     Date:  2025  Date of Admission:  2025    History provided by:patient and   Chief Complaint:     Chief Complaint   Patient presents with    Weakness    Hypotension       HPI:   Talita Horan is a(n) 75 year old female with a history of HL who was just in  and arrived home on Wed who presents to ER with malaise and poor appetite.  Pt had nonbloody diarrhea and n/v for several days in  which then resolved.  Pt was nauseous on the plane home.    Since being home pt has had very poor appetite.  Pt has not had diarrhea or vomiting since being home but has hardly eaten.  In ER SBP in 60s initially.   Pt transferring to ICU for pressors.   No CP or SOB.     Pt found to have UTI here.   Pt noting cough which started today.        History   Past Medical History[1]  Past Surgical History[2]  Family History[3]  Social History:  Short Social Hx on File[4]  Allergies/Medications:   Allergies: Allergies[5]    Home medications  Prescriptions Prior to Admission[6]    Review of Systems:     The remainder of the ROS is negative except as noted in the HPI.    Physical Exam:   Vital Signs:  Blood pressure 93/74, pulse 116, temperature 97.4 °F (36.3 °C), temperature source Temporal, resp. rate 23, height 63\", weight 122 lb (55.3 kg), SpO2 99%.    Gen:   NAD.   A and O x 3  Eyes:  PERRL  Moist mucus membranes.    CV:    RRR.  No m/g/r  Pulm:   CTA bilat  Abd:   +bs, soft, NT, ND  LE:   No c/c/e  Neuro:   nonfocal  Skin:  No rash    Results:     Lab Results   Component Value Date    WBC 24.3 (H) 2025    HGB 14.1 2025    HCT 39.8 2025    .0 2025    CREATSERUM 2.69 (H) 2025    BUN 61 (H) 2025     (L)  07/19/2025    K 3.1 (L) 07/19/2025    CL 96 (L) 07/19/2025    CO2 21.0 07/19/2025     (H) 07/19/2025    CA 8.9 07/19/2025    ALB 3.3 07/19/2025    ALKPHO 171 (H) 07/19/2025    BILT 4.0 (H) 07/19/2025    TP 5.7 07/19/2025    AST 21 07/19/2025    ALT 33 07/19/2025    TSH 1.379 01/28/2025    MG 2.1 07/19/2025       US ABDOMEN LIMITED (CPT=76705)  Result Date: 7/19/2025  IMPRESSION: No acute pathology Electronically Verified and Signed by Attending Radiologist: Francisco Hernandez MD 7/19/2025 6:10 PM Workstation: [email or web address]    CT ABDOMEN+PELVIS(CPT=74176)  Result Date: 7/19/2025  CONCLUSION: 1.  No acute pathology in the abdomen or pelvis. Electronically Verified and Signed by Attending Radiologist: Francisco Hernandez MD 7/19/2025 4:26 PM Workstation: [email or web address]    XR CHEST AP PORTABLE  (CPT=71045)  Result Date: 7/19/2025  CONCLUSION: 1.  No acute findings Electronically Verified and Signed by Attending Radiologist: Francisco Hernandez MD 7/19/2025 4:05 PM Workstation: [email or web address]    EKG 12 Lead  Result Date: 7/19/2025  Atrial fibrillation with rapid ventricular response Nonspecific ST abnormality Abnormal ECG When compared with ECG of 08-FEB-2025 05:26, Atrial fibrillation has replaced Sinus rhythm Borderline criteria for Anterior infarct are no longer Present      Assessment/Plan:      Hypovolemic shock.  Recent gastroenteritis.  Poor appetite.  Severe dehdyration  SON  - IVF  - levophed  - ICU admit  - d/w intensivits  - stool panel if diarrhea  - follow bmp    Sepsis due to UTI  R/o PNA with cough.  CXR clear.       Elevated lactic acid  Leukocytosis  - IVF and pressors as above  - follow lactic acid  - cont ceftriaxone and azithromycin  - check resp panel  - pulm/critical care on consult  - follow cbc    dvt proph - heparin    Code status - Full    Leandro Huerta MD  7/19/2025        [1]   Past Medical History:   Hyperlipidemia    UTI (urinary tract infection)   [2]   Past Surgical History:  Procedure Laterality Date     Appendectomy  2025    Laparoscopic appendectomy    Colonoscopy  2020    Knee surgery Right           Tonsillectomy     [3]   Family History  Problem Relation Age of Onset    Cancer Mother     Breast Cancer Mother     Cancer Father     Breast Cancer Maternal Grandmother    [4]   Social History  Socioeconomic History    Marital status:    Tobacco Use    Smoking status: Never     Passive exposure: Never    Smokeless tobacco: Never   Vaping Use    Vaping status: Never Used   Substance and Sexual Activity    Alcohol use: Not Currently     Comment: very occasional    Drug use: Never     Social Drivers of Health     Food Insecurity: No Food Insecurity (2025)    NCSS - Food Insecurity     Worried About Running Out of Food in the Last Year: No     Ran Out of Food in the Last Year: No   Transportation Needs: No Transportation Needs (2025)    NCSS - Transportation     Lack of Transportation: No   Housing Stability: Not At Risk (2025)    NCSS - Housing/Utilities     Has Housing: Yes     Worried About Losing Housing: No     Unable to Get Utilities: No   [5]   Allergies  Allergen Reactions    Penicillins RASH    Sulfa Antibiotics ITCHING and RASH   [6]   Medications Prior to Admission   Medication Sig    rosuvastatin 10 MG Oral Tab Take 1 tablet (10 mg total) by mouth nightly.    alendronate 70 MG Oral Tab Take 1 tablet (70 mg total) by mouth once a week.    Multiple Vitamins-Minerals (WOMENS 50+ MULTI VITAMIN/MIN) Oral Tab Take 1 tablet by mouth daily.

## 2025-07-19 NOTE — ED INITIAL ASSESSMENT (HPI)
Patient arrives with reports of increased weakness. +fevers.   +diarrhea/vomiting. Was seen by GI MD on Monday for same complaint.  Unable to tolerate fluids. +cough.

## 2025-07-19 NOTE — ED PROVIDER NOTES
[facility] Emergency Department Note  Patient: Talita oHran Age: 75 year old Sex: female      MRN: [id number]  : 1950    Patient Seen in: [facility] Emergency Department    History     Chief Complaint   Patient presents with    Weakness    Hypotension     Stated Complaint: vomiting    History obtained from: Patient and patient's     Patient is a 75-year-old female with past medical history of mild cognitive impairment with memory loss, hyperlipidemia presenting today for evaluation of weakness and fevers.  Patient also endorses nausea, vomiting and diarrhea.  Patient's  provides most of the history.  He states that over the past 5 days, the patient has been having nausea, vomiting and diarrhea.  Symptoms started while in the [place].  Returned home yesterday.  States that she had fevers approximately 3 days ago which have not reoccurred.  Developed a cough today.  No shortness of breath.  No chest pain.  Denies any abdominal pain currently.  Denies any urinary symptoms.  No blood noted in stool or emesis.    Review of Systems:  Review of Systems  Positive for stated complaint: vomiting. Constitutional and vital signs reviewed. All other systems reviewed and negative except as noted above.    Patient History:  Past Medical History[1]    Past Surgical History[2]     Family History[3]    Specific Social Determinants of Health:   Short Social Hx on File[4]        PSFH elements reviewed from today and agreed except as otherwise stated in HPI.    Physical Exam     ED Triage Vitals [25 1433]   BP (!) 64/41   Pulse 88   Resp 18   Temp 97.4 °F (36.3 °C)   Temp src Temporal   SpO2 96 %   O2 Device None (Room air)       Current:BP 93/74   Pulse 116   Temp 97.4 °F (36.3 °C) (Temporal)   Resp 23   Ht 160 cm (5' 3\")   Wt 55.3 kg   SpO2 99%   BMI 21.61 kg/m²         Physical Exam  Constitutional:       General: She is not in acute distress.  HENT:      Head: Normocephalic and atraumatic.       Mouth/Throat:      Mouth: Mucous membranes are dry.   Eyes:      Extraocular Movements: Extraocular movements intact.   Cardiovascular:      Rate and Rhythm: Normal rate and regular rhythm.      Heart sounds: Normal heart sounds.   Pulmonary:      Effort: Pulmonary effort is normal. No respiratory distress.      Breath sounds: Normal breath sounds.   Abdominal:      Palpations: Abdomen is soft.      Tenderness: There is no abdominal tenderness.   Skin:     General: Skin is warm and dry.      Capillary Refill: Capillary refill takes less than 2 seconds.      Findings: No rash.   Neurological:      General: No focal deficit present.      Mental Status: She is alert and oriented to person, place, and time.   Psychiatric:         Mood and Affect: Mood normal.         Behavior: Behavior normal.         ED Course   Labs:   Labs Reviewed   COMP METABOLIC PANEL (14) - Abnormal; Notable for the following components:       Result Value    Glucose 132 (*)     Sodium 129 (*)     Potassium 3.1 (*)     Chloride 96 (*)     BUN 61 (*)     Creatinine 2.69 (*)     BUN/CREA Ratio 22.7 (*)     eGFR-Cr 18 (*)     Alkaline Phosphatase 171 (*)     Bilirubin, Total 4.0 (*)     All other components within normal limits   CBC WITH DIFFERENTIAL WITH PLATELET - Abnormal; Notable for the following components:    WBC 24.3 (*)     Neutrophil Absolute Prelim 20.41 (*)     Neutrophil Absolute 20.41 (*)     All other components within normal limits   URINALYSIS WITH CULTURE REFLEX - Abnormal; Notable for the following components:    Clarity Urine Turbid (*)     Glucose Urine 50 (*)     Blood Urine 1+ (*)     Protein Urine 100 (*)     Leukocyte Esterase Urine 250 (*)     WBC Urine >50 (*)     RBC Urine 3-5 (*)     Bacteria Urine Rare (*)     Squamous Epi. Cells Few (*)     WBC Clump Present (*)     All other components within normal limits   LACTIC ACID, PLASMA - Abnormal; Notable for the following components:    Lactic Acid 3.1 (*)     All other  components within normal limits   RBC MORPHOLOGY SCAN - Abnormal; Notable for the following components:    RBC Morphology See morphology below (*)     All other components within normal limits   ICTOTEST - Abnormal; Notable for the following components:    Ictotest Positive (*)     All other components within normal limits   MAGNESIUM - Normal   SCAN SLIDE   LACTIC ACID REFLEX POST POSTIVE   LEGIONELLA URINE AG SEROGRP 1   STREPTOCOCCUS PNEUMONIAE AG, URINE   MYCOPLASMA PNEU. IGG/IGM ABS   RAINBOW DRAW LAVENDER   RAINBOW DRAW LIGHT GREEN   RAINBOW DRAW BLUE   RAINBOW DRAW GOLD   BLOOD CULTURE   BLOOD CULTURE   URINE CULTURE, ROUTINE   RESPIRATORY FLU EXPAND PANEL + COVID-19     Radiology findings:  I personally reviewed the images.   US ABDOMEN LIMITED (CPT=76705)  Result Date: 7/19/2025  IMPRESSION: No acute pathology Electronically Verified and Signed by Attending Radiologist: Francisco Hernandez MD 7/19/2025 6:10 PM Workstation: [id number]    CT ABDOMEN+PELVIS(CPT=74176)  Result Date: 7/19/2025  CONCLUSION: 1.  No acute pathology in the abdomen or pelvis. Electronically Verified and Signed by Attending Radiologist: Francisco Hernandez MD 7/19/2025 4:26 PM Workstation: [id number]    XR CHEST AP PORTABLE  (CPT=71045)  Result Date: 7/19/2025  CONCLUSION: 1.  No acute findings Electronically Verified and Signed by Attending Radiologist: Francisco Hernandez MD 7/19/2025 4:05 PM Workstation: [employer]      EKG as interpreted by me: Patient with rate 116, atrial fibrillation with rapid ventricular response, normal axis, narrow QRS, QTc 469 ms, no ST segment elevations or depressions, no abnormal T wave inversions  Cardiac Monitor: Interpreted by me.   Pulse Readings from Last 1 Encounters:   07/19/25 116   , atrial fibrillation,     External non-ED records reviewed independently by me: PCP note from 2/14/2025 reviewed confirming patient has past surgical history of laparoscopic appendectomy on 2/8/2025 also has a history of ongoing  pruritus of unclear etiology    MDM   75-year-old female with past medical history of mild cognitive impairment with memory loss, hyperlipidemia presenting today for evaluation of weakness, nausea, vomiting and diarrhea over the past 5 days pulmonary over the emergency department, patient is hypotensive but otherwise alert, no focal neurologic deficits.  She has no reproducible abdominal tenderness.  Appears dry    Differential diagnoses considered includes, but is not limited to: Hypovolemia, gastroenteritis, gastritis, foodborne illness, intra-abdominal infectious etiology, UTI, pneumonia    Will obtain the following tests: CBC, CMP, magnesium, lactic acid, blood cultures x 2, urinalysis with urine culture, chest x-ray, CT abdomen pelvis with IV contrast, EKG  Please see ED course for my independent review of these tests/imaging results.    Initial Medications/Therapeutics administered: IV fluids, IV Zofran    Chronic conditions affecting care: Cognitive impairment with memory loss, hyperlipidemia    Workup and medications considered but not ordered: None    Social Determinants of Health that impacted care: None    ED Course as of 07/19/25 1825  ------------------------------------------------------------  Time: 07/19 1700  Comment: Laboratory workup with leukocytosis of 24.3.  I believe a large element of this is reactive in the setting of vomiting and diarrhea.  Her lactic acid is 3.1.  Urinalysis does reveal a mild urinary tract infection however I do not believe her UTI is the source of her hypotension.  Rather, I believe her hypotension is secondary to hypovolemia in the setting of vomiting and diarrhea over the past several days.  Has received a total of 2 L of IV fluids thus far with only mild improvement of her blood pressures therefore was started on a low-dose of peripheral Levophed for blood pressure support.  Covered empirically with Rocephin for antibiotics.  I independently reviewed her chest x-ray  images that show no evidence of pneumonia.  CT abdomen/pelvis with no evidence of bowel obstruction.  Agree with radiology reads above.  ------------------------------------------------------------  Time: 07/19 1703  Comment: I spoke with Dr. Barrera who requested additional fluids. Okay with pressors if needed.   ------------------------------------------------------------  Time: 07/19 1721  Comment: Patient's case was endorsed to Dr. Huerta, hospitalist, was made aware of plans for admission to ICU.  Requested cardiology consult.  ------------------------------------------------------------  Time: 07/19 1727  Comment: Endorsed patient's case to ZENAIDA Hernandez, who agreed with plan for holding anticoagulation at this time.      .Critical Care:  I spent a total of 60 minutes of critical care time in obtaining history, performing a physical exam, bedside monitoring of interventions, collecting and interpreting tests and discussion with consultants but not including time spent performing procedures.      [facility]  part of [facility]      Sepsis Reassessment Note    BP 93/74   Pulse 116   Temp 97.4 °F (36.3 °C) (Temporal)   Resp 23   Ht 160 cm (5' 3\")   Wt 55.3 kg   SpO2 99%   BMI 21.61 kg/m²      I completed the sepsis reassessment at 1720    Cardiac:  Regularity: Irregular  Rate: Tachycardic  Heart Sounds: S1,S2    Lungs:   Right: Clear  Left: Clear    Peripheral Pulses:  Radial: Right 1+ or Left 1+      Capillary Refill:  <3 Secs    Skin:  Temp/Moisture: Warm and Dry  Color: Normal      Crystal Lane MD  7/19/2025  5:20 PM        Disposition and Plan     Clinical Impression:  1. SON (acute kidney injury)    2. Acute cystitis with hematuria    3. Hypotension due to hypovolemia        Disposition:  Admit    Follow-up:  No follow-up provider specified.    Medications Prescribed:  Current Discharge Medication List          Hospital Problems       Present on Admission  Date Reviewed:  2025          ICD-10-CM Noted POA    * (Principal) SON (acute kidney injury) N17.9 2025 Unknown    Acute cystitis with hematuria N30.01 2025 Unknown    Dehydration E86.0 2025 Unknown    Hypotension due to hypovolemia E86.1 2025 Unknown    Nausea vomiting and diarrhea R11.2, R19.7 2025 Unknown        This note may have been created using voice dictation technology and may include inadvertent errors.      Crystal Lane MD  Emergency Medicine             [1]   Past Medical History:   Hyperlipidemia    UTI (urinary tract infection)   [2]   Past Surgical History:  Procedure Laterality Date    Appendectomy  2025    Laparoscopic appendectomy    Colonoscopy  2020    Knee surgery Right           Tonsillectomy     [3]   Family History  Problem Relation Age of Onset    Cancer Mother     Breast Cancer Mother     Cancer Father     Breast Cancer Maternal Grandmother    [4]   Social History  Socioeconomic History    Marital status:    Tobacco Use    Smoking status: Never     Passive exposure: Never    Smokeless tobacco: Never   Vaping Use    Vaping status: Never Used   Substance and Sexual Activity    Alcohol use: Not Currently     Comment: very occasional    Drug use: Never     Social Drivers of Health     Food Insecurity: No Food Insecurity (2025)    NCSS - Food Insecurity     Worried About Running Out of Food in the Last Year: No     Ran Out of Food in the Last Year: No   Transportation Needs: No Transportation Needs (2025)    NCSS - Transportation     Lack of Transportation: No   Housing Stability: Not At Risk (2025)    NCSS - Housing/Utilities     Has Housing: Yes     Worried About Losing Housing: No     Unable to Get Utilities: No

## 2025-07-19 NOTE — CONSULTS
[facility]  part of [facility]    Report of Consultation    Talita Horan Patient Status:  Emergency    1950 MRN [id number]   Location [facility] EMERGENCY DEPARTMENT Attending Crystal Lane MD   Hosp Day # 0 PCP Charley Dickerson MD     Date of Admission:  2025  Date of Consult: 2025    Reason for Consultation:   Consults  Gastroenteritis  UTI  Hypotension  Acute kidney injury    History provided by:patient and spouse  HPI:     Chief Complaint   Patient presents with    Weakness    Hypotension     HPI    75-year-old female with history of mild cognitive impairment otherwise healthy  Recent travel to  came back on  she developed diarrhea and she spiked fever once and no diarrhea in the last few days and no fever but very poor oral intake and fatigue  In ER initial blood pressure 70/40 with a heart rate of 110  Denied abdominal pain or vomiting but very poor appetite in the last few days with mild nausea  No hematemesis or hematochezia  Developed mild dry cough last 2 days with no dyspnea or sputum  Denied flank plain or hematuria or dysuria  No lower extremity edema or pain or calf tenderness  Denied TB or exposure  No skin rashes or joints pain     History   Past Medical History[1]  Past Surgical History[2]  Family History[3]  Social History:  Short Social Hx on File[4]  Allergies/Medications:   Allergies: Allergies[5]  Prescriptions Prior to Admission[6]    Review of Systems:     Constitutional:  Positive for fatigue. Negative for fever.   HENT:  Negative for congestion.    Respiratory:  Positive for cough. Negative for chest tightness, shortness of breath and wheezing.    Cardiovascular:  Positive for palpitations. Negative for chest pain and leg swelling.   Gastrointestinal:  Positive for diarrhea. Negative for vomiting.   Musculoskeletal: Negative.    Neurological: Negative.    Psychiatric/Behavioral:  Negative for agitation.        Physical  Exam:   Vital Signs:   height is 5' 3\" (1.6 m) and weight is 122 lb (55.3 kg). Her temporal temperature is 97.4 °F (36.3 °C). Her blood pressure is 84/54 (abnormal) and her pulse is 102. Her respiration is 22 and oxygen saturation is 98%.   Physical Exam  Vitals and nursing note reviewed.   Constitutional:       General: She is not in acute distress.  HENT:      Head: Normocephalic and atraumatic.      Nose: Nose normal.      Mouth/Throat:      Mouth: Mucous membranes are dry.   Eyes:      General: No scleral icterus.  Cardiovascular:      Rate and Rhythm: Tachycardia present. Rhythm irregular.      Heart sounds:      No gallop.   Pulmonary:      Effort: Pulmonary effort is normal. No respiratory distress.      Breath sounds: Normal breath sounds. No stridor. No wheezing, rhonchi or rales.   Chest:      Chest wall: No tenderness.   Abdominal:      General: Abdomen is flat. Bowel sounds are normal.      Palpations: Abdomen is soft.      Tenderness: There is no abdominal tenderness. There is no guarding or rebound.   Musculoskeletal:      Cervical back: Neck supple. No rigidity.      Right lower leg: No edema.      Left lower leg: No edema.   Lymphadenopathy:      Cervical: No cervical adenopathy.   Skin:     General: Skin is dry.   Neurological:      General: No focal deficit present.      Mental Status: She is oriented to person, place, and time.         Results:     Lab Results   Component Value Date    WBC 24.3 (H) 07/19/2025    HGB 14.1 07/19/2025    HCT 39.8 07/19/2025    .0 07/19/2025    CREATSERUM 2.69 (H) 07/19/2025    BUN 61 (H) 07/19/2025     (L) 07/19/2025    K 3.1 (L) 07/19/2025    CL 96 (L) 07/19/2025    CO2 21.0 07/19/2025     (H) 07/19/2025    CA 8.9 07/19/2025    ALB 3.3 07/19/2025    ALKPHO 171 (H) 07/19/2025    BILT 4.0 (H) 07/19/2025    TP 5.7 07/19/2025    AST 21 07/19/2025    ALT 33 07/19/2025    TSH 1.379 01/28/2025    MG 2.1 07/19/2025     CT  ABDOMEN+PELVIS(CPT=74176)  Result Date: 7/19/2025  CONCLUSION: 1.  No acute pathology in the abdomen or pelvis. Electronically Verified and Signed by Attending Radiologist: Francisco Hernandez MD 7/19/2025 4:26 PM Workstation: [employer]    XR CHEST AP PORTABLE  (CPT=71045)  Result Date: 7/19/2025  CONCLUSION: 1.  No acute findings Electronically Verified and Signed by Attending Radiologist: Francisco Hernandez MD 7/19/2025 4:05 PM Workstation: [employer]    EKG 12 Lead  Result Date: 7/19/2025  Atrial fibrillation with rapid ventricular response Nonspecific ST abnormality Abnormal ECG When compared with ECG of 08-FEB-2025 05:26, Atrial fibrillation has replaced Sinus rhythm Borderline criteria for Anterior infarct are no longer Present      Impression:     1-acute gastroenteritis with recent travel   2-UTI  3-acute kidney injury  4-hypotension which is likely volume depletion and sepsis  5-new onset A-fib with RVR  6-mild cognitive impairment  7- high bilirubin       CXR clear   Abd/pelvic ct negative   Lactic acid 3.1 and leukocytosis   UA + LE       Plan :  Sepsis reassessment protocol  IV fluid and pressors  Close monitoring in ICU  Check cultures , check urine Legionella antigen , respiratory panel  IV Rocephin and azithromycin   Cardiology evaluation for A-fib  DVT prophylaxis with heparin subcu    D/w pt and        [facility]  part of [facility]      Sepsis Reassessment Note    BP (!) 84/54   Pulse 102   Temp 97.4 °F (36.3 °C) (Temporal)   Resp 22   Ht 5' 3\" (1.6 m)   Wt 122 lb (55.3 kg)   SpO2 98%   BMI 21.61 kg/m²      I completed the sepsis reassessment at 6 5;30 pm     Cardiac:  Regularity: Irregular  Rate: Tachycardic  Heart Sounds: S1,S2    Lungs:   Right: Clear  Left: Clear    Peripheral Pulses:  Radial: Right 1+ or Left 1+      Capillary Refill:  <3 Secs    Skin:  Temp/Moisture: Warm and Dry  Color: Normal      Upon my evaluation, this patient had a high probability of imminent  or life-threatening deterioration due to critical findings of laboratory tests, hypotension, shock, renal failure, and sepsis, which required my direct attention, intervention, and personal management.    I have personally provided 35 minutes of critical care time, exclusive of time spent on separately billable procedures.  Time includes review of all pertinent laboratory/radiology results, discussion with consultants, and monitoring for potential decompensation.  Performed interventions included fluids, pressor drugs, and oxygen.       Lauren Barrera MD  2025  5:48 PM             Lauren Barrera MD  2025         [1]   Past Medical History:   Hyperlipidemia    UTI (urinary tract infection)   [2]   Past Surgical History:  Procedure Laterality Date    Appendectomy  2025    Laparoscopic appendectomy    Colonoscopy  2020    Knee surgery Right           Tonsillectomy     [3]   Family History  Problem Relation Age of Onset    Cancer Mother     Breast Cancer Mother     Cancer Father     Breast Cancer Maternal Grandmother    [4]   Social History  Socioeconomic History    Marital status:    Tobacco Use    Smoking status: Never     Passive exposure: Never    Smokeless tobacco: Never   Vaping Use    Vaping status: Never Used   Substance and Sexual Activity    Alcohol use: Not Currently     Comment: very occasional    Drug use: Never     Social Drivers of Health     Food Insecurity: No Food Insecurity (2025)    NCSS - Food Insecurity     Worried About Running Out of Food in the Last Year: No     Ran Out of Food in the Last Year: No   Transportation Needs: No Transportation Needs (2025)    NCSS - Transportation     Lack of Transportation: No   Housing Stability: Not At Risk (2025)    NCSS - Housing/Utilities     Has Housing: Yes     Worried About Losing Housing: No     Unable to Get Utilities: No   [5]   Allergies  Allergen Reactions    Penicillins RASH    Sulfa Antibiotics ITCHING  and RASH   [6] (Not in a hospital admission)

## 2025-07-20 ENCOUNTER — APPOINTMENT (OUTPATIENT)
Dept: CV DIAGNOSTICS | Facility: HOSPITAL | Age: 75
End: 2025-07-20
Attending: INTERNAL MEDICINE

## 2025-07-20 ENCOUNTER — APPOINTMENT (OUTPATIENT)
Dept: GENERAL RADIOLOGY | Facility: HOSPITAL | Age: 75
End: 2025-07-20
Attending: INTERNAL MEDICINE

## 2025-07-20 LAB
ALBUMIN SERPL-MCNC: 2.7 G/DL (ref 3.2–4.8)
ALBUMIN/GLOB SERPL: 1.3 (ref 1–2)
ALP LIVER SERPL-CCNC: 146 U/L (ref 55–142)
ALT SERPL-CCNC: 29 U/L (ref 10–49)
ANION GAP SERPL CALC-SCNC: 11 MMOL/L (ref 0–18)
AST SERPL-CCNC: 23 U/L (ref ?–34)
BASOPHILS # BLD AUTO: 0.19 X10(3) UL (ref 0–0.2)
BASOPHILS NFR BLD AUTO: 0.9 %
BILIRUB SERPL-MCNC: 2.8 MG/DL (ref 0.2–1.1)
BUN BLD-MCNC: 46 MG/DL (ref 9–23)
BUN/CREAT SERPL: 26.6 (ref 10–20)
CALCIUM BLD-MCNC: 7.8 MG/DL (ref 8.7–10.4)
CHLORIDE SERPL-SCNC: 107 MMOL/L (ref 98–112)
CO2 SERPL-SCNC: 19 MMOL/L (ref 21–32)
CREAT BLD-MCNC: 1.73 MG/DL (ref 0.55–1.02)
DEPRECATED RDW RBC AUTO: 40.2 FL (ref 35.1–46.3)
EGFRCR SERPLBLD CKD-EPI 2021: 31 ML/MIN/1.73M2 (ref 60–?)
EOSINOPHIL # BLD AUTO: 0.26 X10(3) UL (ref 0–0.7)
EOSINOPHIL NFR BLD AUTO: 1.3 %
ERYTHROCYTE [DISTWIDTH] IN BLOOD BY AUTOMATED COUNT: 12.9 % (ref 11–15)
GLOBULIN PLAS-MCNC: 2.1 G/DL (ref 2–3.5)
GLUCOSE BLD-MCNC: 128 MG/DL (ref 70–99)
HCT VFR BLD AUTO: 36.4 % (ref 35–48)
HGB BLD-MCNC: 12.9 G/DL (ref 12–16)
IMM GRANULOCYTES # BLD AUTO: 0.36 X10(3) UL (ref 0–1)
IMM GRANULOCYTES NFR BLD: 1.7 %
LYMPHOCYTES # BLD AUTO: 2.35 X10(3) UL (ref 1–4)
LYMPHOCYTES NFR BLD AUTO: 11.3 %
MCH RBC QN AUTO: 29.9 PG (ref 26–34)
MCHC RBC AUTO-ENTMCNC: 35.4 G/DL (ref 31–37)
MCV RBC AUTO: 84.3 FL (ref 80–100)
MONOCYTES # BLD AUTO: 0.79 X10(3) UL (ref 0.1–1)
MONOCYTES NFR BLD AUTO: 3.8 %
NEUTROPHILS # BLD AUTO: 16.83 X10 (3) UL (ref 1.5–7.7)
NEUTROPHILS # BLD AUTO: 16.83 X10(3) UL (ref 1.5–7.7)
NEUTROPHILS NFR BLD AUTO: 81 %
OSMOLALITY SERPL CALC.SUM OF ELEC: 298 MOSM/KG (ref 275–295)
PLATELET # BLD AUTO: 209 10(3)UL (ref 150–450)
POTASSIUM SERPL-SCNC: 3.3 MMOL/L (ref 3.5–5.1)
POTASSIUM SERPL-SCNC: 3.3 MMOL/L (ref 3.5–5.1)
PROCALCITONIN SERPL-MCNC: 3.8 NG/ML (ref ?–0.05)
PROT SERPL-MCNC: 4.8 G/DL (ref 5.7–8.2)
Q-T INTERVAL: 338 MS
QRS DURATION: 78 MS
QTC CALCULATION (BEZET): 469 MS
R AXIS: 72 DEGREES
RBC # BLD AUTO: 4.32 X10(6)UL (ref 3.8–5.3)
SODIUM SERPL-SCNC: 137 MMOL/L (ref 136–145)
T AXIS: 41 DEGREES
TSI SER-ACNC: 0.68 UIU/ML (ref 0.55–4.78)
VENTRICULAR RATE: 116 BPM
WBC # BLD AUTO: 20.8 X10(3) UL (ref 4–11)

## 2025-07-20 PROCEDURE — 93306 TTE W/DOPPLER COMPLETE: CPT | Performed by: INTERNAL MEDICINE

## 2025-07-20 PROCEDURE — 71045 X-RAY EXAM CHEST 1 VIEW: CPT | Performed by: RADIOLOGY

## 2025-07-20 PROCEDURE — 99233 SBSQ HOSP IP/OBS HIGH 50: CPT | Performed by: HOSPITALIST

## 2025-07-20 PROCEDURE — 99291 CRITICAL CARE FIRST HOUR: CPT | Performed by: INTERNAL MEDICINE

## 2025-07-20 RX ORDER — BENZONATATE 200 MG/1
200 CAPSULE ORAL 3 TIMES DAILY PRN
Status: DISCONTINUED | OUTPATIENT
Start: 2025-07-20 | End: 2025-07-23

## 2025-07-20 NOTE — CONSULTS
[facility]  Cardiology Consultation    Talita Horan Patient Status:  Inpatient    1950 MRN [id number]   Location [facility] 2W/SW Attending Leandro Higgins MD   Hosp Day # 1 PCP Charley Dickerson MD     Reason for Consultation:  Paroxysmal atrial fibrillation     History of Present Illness:  Talita Horan is a a(n) 75 year old female who presents with complaint of diarrhea after returning from .  Nausea vomiting.  While in the emergency room she had an run of atrial fibrillation with rapid ventricular response.  Currently she is in normal sinus.  Less than 24 hours of paroxysmal A-fib  Denies any chest pain shortness of breath dizziness lightheadedness syncope near syncope.  Denies any cardiac history.    History:  Past Medical History[1]  Past Surgical History[2]  Family History[3]   reports that she has never smoked. She has never been exposed to tobacco smoke. She has never used smokeless tobacco. She reports that she does not currently use alcohol. She reports that she does not use drugs.    Allergies:  Allergies[4]    Medications:  Current Hospital Medications[5]    Review of Systems:  A comprehensive review of systems was negative if not otherwise mention in above HPI.    /56 (BP Location: Right arm)   Pulse 109   Temp 98.1 °F (36.7 °C) (Temporal)   Resp (!) 28   Ht 5' 3\" (1.6 m)   Wt 134 lb 4.2 oz (60.9 kg)   SpO2 95%   BMI 23.78 kg/m²   Temp (24hrs), Av.6 °F (36.4 °C), Min:97 °F (36.1 °C), Max:98.1 °F (36.7 °C)       Intake/Output Summary (Last 24 hours) at 2025 0732  Last data filed at 2025 0516  Gross per 24 hour   Intake 4734 ml   Output 1400 ml   Net 3334 ml     Wt Readings from Last 3 Encounters:   25 134 lb 4.2 oz (60.9 kg)   25 128 lb (58.1 kg)   25 130 lb (59 kg)       Physical Exam:   General: Alert and oriented x 3. No apparent distress. No respiratory or constitutional distress.  HEENT: Normocephalic,  anicteric sclera, neck supple.  Neck: No JVD, carotids 2+, no bruits.  Cardiac: Regular rate and rhythm. S1, S2 normal. No murmur, pericardial rub, S3.  Lungs: Clear without wheezes, rales, rhonchi or dullness.  Normal excursions and effort.  Abdomen: Soft, non-tender. BS-present.  Extremities: Without clubbing, cyanosis or edema.  Peripheral pulses are 2+.  Neurologic: Alert and oriented, normal affect.  Skin: Warm and dry.     Laboratory Data:  Lab Results   Component Value Date    WBC 20.8 2025    HGB 12.9 2025    HCT 36.4 2025    .0 2025    CREATSERUM 1.73 2025    BUN 46 2025     2025    K 3.3 2025    K 3.3 2025     2025    CO2 19.0 2025     2025    CA 7.8 2025    ALB 2.7 2025    ALKPHO 146 2025    BILT 2.8 2025    TP 4.8 2025    AST 23 2025    ALT 29 2025    MG 2.1 2025       Imaging:  EKG, blood test and chest x-ray reviewed  Impression:  Sepsis as per primary service  Acute renal failure  Paroxysmal A-fib currently in sinus rhythm      Recommendations:  Management of sepsis as per primary  2D echocardiogram to assess for LV size and function  No anticoagulation with less than 24 hours of paroxysmal A-fib  Check TSH if not done    Thank you for allowing me to participate in the care of your patient.    Chato Preston MD  2025  7:32 AM    5       [1]   Past Medical History:   Hyperlipidemia    UTI (urinary tract infection)   [2]   Past Surgical History:  Procedure Laterality Date    Appendectomy  2025    Laparoscopic appendectomy    Colonoscopy  2020    Knee surgery Right           Tonsillectomy     [3]   Family History  Problem Relation Age of Onset    Cancer Mother     Breast Cancer Mother     Cancer Father     Breast Cancer Maternal Grandmother    [4]   Allergies  Allergen Reactions    Penicillins RASH    Sulfa Antibiotics ITCHING and RASH   [5]    Current Facility-Administered Medications:     potassium chloride 40 mEq in 250mL sodium chloride 0.9% IVPB premix, 40 mEq, Intravenous, Once    norepinephrine (Levophed) 4 mg/250mL infusion premix, 0.5-50 mcg/min, Intravenous, Continuous    heparin (Porcine) 5000 UNIT/ML injection 5,000 Units, 5,000 Units, Subcutaneous, 2 times per day    sodium chloride 0.9% infusion, , Intravenous, Continuous    cefTRIAXone (Rocephin) 1 g in sodium chloride 0.9% 100 mL IVPB-ADDV, 1 g, Intravenous, Q24H    azithromycin (Zithromax) 500 mg in sodium chloride 0.9% 250mL IVPB premix, 500 mg, Intravenous, Q24H    acetaminophen (Tylenol Extra Strength) tab 1,000 mg, 1,000 mg, Oral, Q6H PRN    ondansetron (Zofran) 4 MG/2ML injection 4 mg, 4 mg, Intravenous, Q6H PRN

## 2025-07-20 NOTE — PROGRESS NOTES
[facility]  part of [facility]    Progress Note    Talita Horan Patient Status:  Inpatient    1950 MRN [id number]   Location [facility] 2W/SW Attending Leandro Higgins MD   Hosp Day # 1 PCP Charley Dickerson MD       Subjective:     Feeling better.  No n/v/d.  Has cough.    Objective:   Blood pressure 119/56, pulse 109, temperature 98.1 °F (36.7 °C), temperature source Temporal, resp. rate (!) 28, height 63\", weight 134 lb 4.2 oz (60.9 kg), SpO2 95%.    Gen:   NAD.   A and O x 3  Eyes:  PERRL  Moist mucus membranes.    CV:    RRR.  No m/g/r  Pulm:   CTA bilat  Abd:   +bs, soft, NT, ND  LE:   No c/c/e  Neuro:   nonfocal  Skin:  No rash    Results:     Lab Results   Component Value Date    WBC 20.8 (H) 2025    HGB 12.9 2025    HCT 36.4 2025    .0 2025    CREATSERUM 1.73 (H) 2025    BUN 46 (H) 2025     2025    K 3.3 (L) 2025    K 3.3 (L) 2025     2025    CO2 19.0 (L) 2025     (H) 2025    CA 7.8 (L) 2025    ALB 2.7 (L) 2025    ALKPHO 146 (H) 2025    BILT 2.8 (H) 2025    TP 4.8 (L) 2025    AST 23 2025    ALT 29 2025    TSH 0.680 2025    MG 2.1 2025       XR CHEST AP PORTABLE  (CPT=71045)  Result Date: 2025  CONCLUSION: Findings suggesting increasing fluid volume status of the patient. Increased prominence of the interstitium and small left pleural effusion. Electronically Verified and Signed by Attending Radiologist: Samuel Corona MD 2025 6:53 AM Workstation: [id number]    US ABDOMEN LIMITED (CPT=76705)  Result Date: 2025  IMPRESSION: No acute pathology Electronically Verified and Signed by Attending Radiologist: Francisco Hernandez MD 2025 6:10 PM Workstation: [id number]    CT ABDOMEN+PELVIS(CPT=74176)  Result Date: 2025  CONCLUSION: 1.  No acute pathology in the abdomen or pelvis. Electronically Verified  and Signed by Attending Radiologist: Francisco Hernandez MD 7/19/2025 4:26 PM Workstation: [id number]    XR CHEST AP PORTABLE  (CPT=71045)  Result Date: 7/19/2025  CONCLUSION: 1.  No acute findings Electronically Verified and Signed by Attending Radiologist: Francisco Hernandez MD 7/19/2025 4:05 PM Workstation: [id number]    EKG 12 Lead  Result Date: 7/20/2025  Atrial fibrillation with rapid ventricular response Nonspecific ST abnormality Abnormal ECG When compared with ECG of 08-FEB-2025 05:26, Atrial fibrillation has replaced Sinus rhythm Borderline criteria for Anterior infarct are no longer Present Confirmed by CHANG HARRIS, ARABELLA ([id number]) on 7/20/2025 10:54:20 AM      Assessment and Plan:     Hypovolemic shock.  Recent gastroenteritis.  Poor appetite.  Severe dehdyration  SON - improving  - IVF  - levophed  - ICU    - stool panel if diarrhea  - follow bmp     Sepsis due to UTI  Possible bronchitis vs PNA       Elevated lactic acid  Leukocytosis  - IVF and pressors as above  - follow lactic acid  - cont ceftriaxone and azithromycin  - resp panel negative  - pulm/critical care on consult  - follow cbc    New a-fib  - TSH ok  - cardiology on consult  - check echo     dvt proph - heparin     Code status - Full    MDM:    High Leandro Huerta MD  7/20/2025

## 2025-07-20 NOTE — PLAN OF CARE
Problem: Patient Centered Care  Goal: Patient preferences are identified and integrated in the patient's plan of care  Description: Interventions:  - What would you like us to know as we care for you?   - Provide timely, complete, and accurate information to patient/family  - Incorporate patient and family knowledge, values, beliefs, and cultural backgrounds into the planning and delivery of care  - Encourage patient/family to participate in care and decision-making at the level they choose  - Honor patient and family perspectives and choices  Outcome: Progressing     Problem: PAIN - ADULT  Goal: Verbalizes/displays adequate comfort level or patient's stated pain goal  Description: INTERVENTIONS:  - Encourage pt to monitor pain and request assistance  - Assess pain using appropriate pain scale  - Administer analgesics based on type and severity of pain and evaluate response  - Implement non-pharmacological measures as appropriate and evaluate response  - Consider cultural and social influences on pain and pain management  - Manage/alleviate anxiety  - Utilize distraction and/or relaxation techniques  - Monitor for opioid side effects  - Notify MD/LIP if interventions unsuccessful or patient reports new pain  - Anticipate increased pain with activity and pre-medicate as appropriate  Outcome: Progressing     Problem: RISK FOR INFECTION - ADULT  Goal: Absence of fever/infection during anticipated neutropenic period  Description: INTERVENTIONS  - Monitor WBC  - Administer growth factors as ordered  - Implement neutropenic guidelines  Outcome: Progressing     Problem: SAFETY ADULT - FALL  Goal: Free from fall injury  Description: INTERVENTIONS:  - Assess pt frequently for physical needs  - Identify cognitive and physical deficits and behaviors that affect risk of falls.  - [place] fall precautions as indicated by assessment.  - Educate pt/family on patient safety including physical limitations  - Instruct pt to call  for assistance with activity based on assessment  - Modify environment to reduce risk of injury  - Provide assistive devices as appropriate  - Consider OT/PT consult to assist with strengthening/mobility  - Encourage toileting schedule  Outcome: Progressing     Problem: DISCHARGE PLANNING  Goal: Discharge to home or other facility with appropriate resources  Description: INTERVENTIONS:  - Identify barriers to discharge w/pt and caregiver  - Include patient/family/discharge partner in discharge planning  - Arrange for needed discharge resources and transportation as appropriate  - Identify discharge learning needs (meds, wound care, etc)  - Arrange for interpreters to assist at discharge as needed  - Consider post-discharge preferences of patient/family/discharge partner  - Complete POLST form as appropriate  - Assess patient's ability to be responsible for managing their own health  - Refer to Case Management Department for coordinating discharge planning if the patient needs post-hospital services based on physician/LIP order or complex needs related to functional status, cognitive ability or social support system  Outcome: Progressing     Problem: RESPIRATORY - ADULT  Goal: Achieves optimal ventilation and oxygenation  Description: INTERVENTIONS:  - Assess for changes in respiratory status  - Assess for changes in mentation and behavior  - Position to facilitate oxygenation and minimize respiratory effort  - Oxygen supplementation based on oxygen saturation or ABGs  - Provide Smoking Cessation handout, if applicable  - Encourage broncho-pulmonary hygiene including cough, deep breathe, Incentive Spirometry  - Assess the need for suctioning and perform as needed  - Assess and instruct to report SOB or any respiratory difficulty  - Respiratory Therapy support as indicated  - Manage/alleviate anxiety  - Monitor for signs/symptoms of CO2 retention  Outcome: Progressing     Problem: CARDIOVASCULAR - ADULT  Goal:  Maintains optimal cardiac output and hemodynamic stability  Description: INTERVENTIONS:  - Monitor vital signs, rhythm, and trends  - Monitor for bleeding, hypotension and signs of decreased cardiac output  - Evaluate effectiveness of vasoactive medications to optimize hemodynamic stability  - Monitor arterial and/or venous puncture sites for bleeding and/or hematoma  - Assess quality of pulses, skin color and temperature  - Assess for signs of decreased coronary artery perfusion - ex. Angina  - Evaluate fluid balance, assess for edema, trend weights  Outcome: Progressing  Goal: Absence of cardiac arrhythmias or at baseline  Description: INTERVENTIONS:  - Continuous cardiac monitoring, monitor vital signs, obtain 12 lead EKG if indicated  - Evaluate effectiveness of antiarrhythmic and heart rate control medications as ordered  - Initiate emergency measures for life threatening arrhythmias  - Monitor electrolytes and administer replacement therapy as ordered  Outcome: Progressing

## 2025-07-20 NOTE — CM/SW NOTE
07/20/25 1000   CM/SW Referral Data   Referral Source Social Work (self-referral)   Reason for Referral Discharge planning   Informant Spouse/Significant Other  (Saleem via phone)   Medical Hx   Does patient have an established PCP? Yes  (Charley Dickerson)   Patient Info   Patient's Home Environment Condo/Apt no elevator   Number of Levels in Home 1   Number of Stair in Home 2 to enter   Patient lives with Spouse/Significant other   Patient Status Prior to Admission   Independent with ADLs and Mobility Yes   Services in place prior to admission DME/Supplies at home   Type of DME/Supplies Straight Cane;Standard Walker;Shower Chair;Grab Bars   Discharge Needs   Anticipated D/C needs To be determined   Choice of Post-Acute Provider   Informed patient of right to choose their preferred provider Yes     SW self referred pt for DC Planning.  SARAVANAN spoke to pt's  Saleem via phone. Above assessment completed.  Verified pt's home address and confirmed she lives w/ Saleem.    Per Saleem, they have approx 2 steps to enter their home.  Pt is independent at baseline and does not use any assistive devices for ambulation.   He believes they have a cane and walker from his past surgeries.    Pt still drives.  Saleem denies hx w/ HH or KARISSA for pt. He believes she has hx w/ Outpatient PT.    Saleem confirmed he and pt just returned home from the  on Wednesday 7/16.    He is agreeable to SW/CM f/up as appropriate and as schedule permits.  He denied any questions at this time.    PLAN: Home, TBD - pending PT/OT & clinical course/med clear      SW/CM to remain available for support and/or discharge planning.       MS CandaceW, LSW [id number]

## 2025-07-20 NOTE — PROGRESS NOTES
[facility]  part of [facility]    Progress Note    Talita Horan Patient Status:  Inpatient    1950 MRN [id number]   Location [facility] 2W/SW Attending Leandro Higgins MD   Hosp Day # 1 PCP Charley Dickerson MD       Subjective:     Constitutional:  Positive for fatigue. Negative for fever.   HENT:  Negative for congestion.    Respiratory:  Positive for cough. Negative for shortness of breath and wheezing.    Cardiovascular:  Positive for palpitations. Negative for chest pain and leg swelling.   Gastrointestinal:  Negative for abdominal distention.   Neurological:  Negative for seizures.   Psychiatric/Behavioral:  Negative for agitation.      Seen and examined   Moist cough , no sputum   On room air at rest   Weak and fatigue   No abd pain and no diarrhea or vomiting   On 2 orestes levophed       Objective:   Blood pressure 119/56, pulse 109, temperature 98.1 °F (36.7 °C), temperature source Temporal, resp. rate (!) 28, height 5' 3\" (1.6 m), weight 134 lb 4.2 oz (60.9 kg), SpO2 95%.  Physical Exam  Vitals and nursing note reviewed.   Constitutional:       General: She is not in acute distress.     Appearance: She is ill-appearing.   HENT:      Head: Atraumatic.      Nose: Nose normal.      Mouth/Throat:      Mouth: Mucous membranes are moist.   Eyes:      General: No scleral icterus.  Cardiovascular:      Rate and Rhythm: Regular rhythm. Tachycardia present.      Heart sounds:      No gallop.   Pulmonary:      Comments: Good air exchange to both lungs with mild basilar rales   No wheezes or rhonchi   Abdominal:      General: Abdomen is flat. Bowel sounds are normal. There is no distension.      Palpations: Abdomen is soft.      Tenderness: There is no abdominal tenderness. There is no guarding or rebound.   Musculoskeletal:      Right lower leg: No edema.      Left lower leg: No edema.   Lymphadenopathy:      Cervical: No cervical adenopathy.   Skin:     General: Skin is  dry.   Neurological:      General: No focal deficit present.      Mental Status: She is oriented to person, place, and time.         Results:   Lab Results   Component Value Date    WBC 20.8 (H) 07/20/2025    HGB 12.9 07/20/2025    HCT 36.4 07/20/2025    .0 07/20/2025    CREATSERUM 1.73 (H) 07/20/2025    BUN 46 (H) 07/20/2025     07/20/2025    K 3.3 (L) 07/20/2025    K 3.3 (L) 07/20/2025     07/20/2025    CO2 19.0 (L) 07/20/2025     (H) 07/20/2025    CA 7.8 (L) 07/20/2025    ALB 2.7 (L) 07/20/2025    ALKPHO 146 (H) 07/20/2025    BILT 2.8 (H) 07/20/2025    TP 4.8 (L) 07/20/2025    AST 23 07/20/2025    ALT 29 07/20/2025    TSH 1.379 01/28/2025    MG 2.1 07/19/2025       XR CHEST AP PORTABLE  (CPT=71045)  Result Date: 7/20/2025  CONCLUSION: Findings suggesting increasing fluid volume status of the patient. Increased prominence of the interstitium and small left pleural effusion. Electronically Verified and Signed by Attending Radiologist: Samuel Corona MD 7/20/2025 6:53 AM Workstation: [email or web address]    US ABDOMEN LIMITED (CPT=76705)  Result Date: 7/19/2025  IMPRESSION: No acute pathology Electronically Verified and Signed by Attending Radiologist: Francisco Hernandez MD 7/19/2025 6:10 PM Workstation: [id number]    CT ABDOMEN+PELVIS(CPT=74176)  Result Date: 7/19/2025  CONCLUSION: 1.  No acute pathology in the abdomen or pelvis. Electronically Verified and Signed by Attending Radiologist: Francisco Hernandez MD 7/19/2025 4:26 PM Workstation: [id number]    XR CHEST AP PORTABLE  (CPT=71045)  Result Date: 7/19/2025  CONCLUSION: 1.  No acute findings Electronically Verified and Signed by Attending Radiologist: Francisco Hernandez MD 7/19/2025 4:05 PM Workstation: [id number]    EKG 12 Lead  Result Date: 7/20/2025  Atrial fibrillation with rapid ventricular response Nonspecific ST abnormality Abnormal ECG When compared with ECG of 08-FEB-2025 05:26, Atrial fibrillation has replaced Sinus rhythm Borderline criteria for  Anterior infarct are no longer Present      Assessment & Plan:       1- shock state   Hypovolemic and septic     -acute gastroenteritis with recent travel to [place] / back on 7/16  -UTI  - cough and picture of acute bronchitis or pneumonia       CXR clear   Abd/pelvic ct negative   Initial Lactic acid 3.1 and leukocytosis / down trending today   UA + LE with pending culture   BC pending   Mycoplasma serology pending     Negative respiratory panel   Negative legionella and pneumococcal antigen     S/p Sepsis reassessment protocol  IV fluid and pressors / on 1-2 orestes of levo and likely to wean off   Keep in ICU  IV Rocephin and azithromycin   F/u CXR in am       2-acute kidney injury  Better + balance with good UO   Pressor for SBP > 90 and MAP > 65   IV fluid and f/u     3-new onset intermittent A-fib with RVR  Check echo   Cardiology following     4- high bilirubin   Negative abd US   Down trending      5-DVT prophylaxis with heparin subcu     D/w pt and    D/w staff       Upon my evaluation, this patient had a high probability of imminent or life-threatening deterioration due to critical findings of laboratory tests, shock, and renal failure, which required my direct attention, intervention, and personal management.    I have personally provided 35 minutes of critical care time, exclusive of time spent on separately billable procedures.  Time includes review of all pertinent laboratory/radiology results, discussion with consultants, and monitoring for potential decompensation.  Performed interventions included fluids and pressor drugs.             Lauren Barrera MD  7/20/2025

## 2025-07-20 NOTE — SLP NOTE
ADULT SWALLOWING EVALUATION    ASSESSMENT    ASSESSMENT/OVERALL IMPRESSION:    Orders received, chart reviewed, clinical bedside swallow evaluation complete. RN authorizes this visit. Patient admitted from home with c/o cough, fever, vomiting, poor appetite, weakness; dx SON, UTI, gastroenteritis; recent travel to ; CXR 7/20/25 reviewed as below. No hx SLP service per this EMR review. Patient's  at bedside. Patient and patient's  report patient symptoms of pill dysphagia (globus sensation, feeling pills get stuck, multiple swallows per pill), denies overt dysphagia symptoms with meals. Denies acute changes in voice, odynophagia, chest pain, early satiety, weight loss.     Patient positioned upright in bed, afebrile, in NAD, grossly oriented x3. Oral motor mechanism examination unremarkable. Vocal quality clear/dry. Patient self-administered bites of dry solid crackers and cup/straw sips thin liquids with oropharyngeal timing and control which appears adequate/functional and without overt signs aspiration/distress.     Patient presents without overt evidence oropharyngeal dysphagia at bedside this date but with reported symptoms pill dysphagia at home over recent months. Patient and family interested in further diagnostics, explained benefit of videofluoroscopic swallow study to objectively investigate symptoms and obtain baseline. Dr. Higgins in agreement. Will proceed with videofluoroscopic swallow study as inpatient as available/appropriate, otherwise refer as outpatient if discharge is imminent. Plan and recommendations reviewed with patient, patient's  and RN at bedside. Written recommendations posted on whiteboard. FCM score: 6/7.       RECOMMENDATIONS   Diet Recommendations - Solids: Regular  Diet Recommendations - Liquids: Thin Liquids  Compensatory Strategies Recommended: Alternate consistencies, Extra sauce/gravy  Aspiration Precautions: Upright position, Slow rate, Small bites,  Small sips  Medication Administration Recommendations: One pill at a time  Treatment Plan/Recommendations: Aspiration precautions    HISTORY   MEDICAL HISTORY  Reason for Referral: RN dysphagia screen (patient reports pill dysphagia at home)    Problem List  Principal Problem:    SON (acute kidney injury)  Active Problems:    Dehydration    Nausea vomiting and diarrhea    Acute cystitis with hematuria    Hypotension due to hypovolemia    Hypovolemic shock (HCC)    Past Medical History  Past Medical History[1]    Prior Living Situation: Home with spouse  Diet Prior to Admission: Regular, Thin liquids  Precautions: Aspiration    Patient/Family Goals: Resolve pill dysphagia, return home    SWALLOWING HISTORY  Current Diet Consistency: Regular, Thin liquids  Dysphagia History: As above    Imaging Results:   CXR 7/20/25:  CONCLUSION: Findings suggesting increasing fluid volume status of the patient. Increased prominence of the interstitium and small left pleural effusion.     OBJECTIVE     Dentition: Natural, Functional  Symmetry: Within Functional Limits  Strength: Within Functional Limits  Tone: Within Functional Limits  Range of Motion: Within Functional Limits  Rate of Motion: Within Functional Limits    Voice Quality: Clear  Respiratory Status: Unlabored  Consistencies Trialed: Thin liquids, Hard solid  Method of Presentation: Self presentation  Patient Positioned: Upright, Midline    Oral Phase of Swallow: Within Functional Limits - adequate timing and control without appreciable oral stasis    Pharyngeal Phase of Swallow: Within Functional Limits - no overt signs aspiration/distress at bedside  (Please note: Silent aspiration cannot be evaluated clinically. Videofluoroscopic Swallow Study is required to rule-out silent aspiration.)    Esophageal Phase of Swallow: Complaints consistent with possible esophageal involvement  Comments: Patient c/o globus sensation with pills      GOALS  Goal #1 Patient will  demonstrate safe and efficient clinical tolerance for regular diet and thin liquids without overt signs aspiration/distress x1-2 f/u visits.     In Progress   Goal #2 The patient/family/caregiver will demonstrate understanding and implementation of aspiration precautions and swallow strategies independently over 1-2 session(s).    In Progress     FOLLOW UP  Treatment Plan/Recommendations: Aspiration precautions  Duration: 1 week  Follow Up Needed (Documentation Required): Yes  SLP Follow-up Date: 07/21/25    Thank you for your referral.   If you have any questions, please contact Huma Garcia, SLP  Huma Garcia M.S. CCC-SLP  Speech-Language Pathologist  [phone]         [1]   Past Medical History:   Hyperlipidemia    UTI (urinary tract infection)

## 2025-07-21 ENCOUNTER — APPOINTMENT (OUTPATIENT)
Dept: GENERAL RADIOLOGY | Facility: HOSPITAL | Age: 75
End: 2025-07-21
Attending: HOSPITALIST

## 2025-07-21 ENCOUNTER — APPOINTMENT (OUTPATIENT)
Dept: GENERAL RADIOLOGY | Facility: HOSPITAL | Age: 75
End: 2025-07-21
Attending: INTERNAL MEDICINE

## 2025-07-21 LAB
ALBUMIN SERPL-MCNC: 2.6 G/DL (ref 3.2–4.8)
ALBUMIN/GLOB SERPL: 1.2 (ref 1–2)
ALP LIVER SERPL-CCNC: 207 U/L (ref 55–142)
ALT SERPL-CCNC: 31 U/L (ref 10–49)
ANION GAP SERPL CALC-SCNC: 8 MMOL/L (ref 0–18)
AST SERPL-CCNC: 31 U/L (ref ?–34)
BASOPHILS # BLD AUTO: 0.13 X10(3) UL (ref 0–0.2)
BASOPHILS NFR BLD AUTO: 0.8 %
BILIRUB SERPL-MCNC: 2.6 MG/DL (ref 0.2–1.1)
BUN BLD-MCNC: 26 MG/DL (ref 9–23)
BUN/CREAT SERPL: 25.5 (ref 10–20)
CALCIUM BLD-MCNC: 7.8 MG/DL (ref 8.7–10.4)
CHLORIDE SERPL-SCNC: 108 MMOL/L (ref 98–112)
CO2 SERPL-SCNC: 22 MMOL/L (ref 21–32)
CREAT BLD-MCNC: 1.02 MG/DL (ref 0.55–1.02)
DEPRECATED RDW RBC AUTO: 41.6 FL (ref 35.1–46.3)
EGFRCR SERPLBLD CKD-EPI 2021: 58 ML/MIN/1.73M2 (ref 60–?)
EOSINOPHIL # BLD AUTO: 0.44 X10(3) UL (ref 0–0.7)
EOSINOPHIL NFR BLD AUTO: 2.6 %
ERYTHROCYTE [DISTWIDTH] IN BLOOD BY AUTOMATED COUNT: 13.2 % (ref 11–15)
GLOBULIN PLAS-MCNC: 2.1 G/DL (ref 2–3.5)
GLUCOSE BLD-MCNC: 115 MG/DL (ref 70–99)
HCT VFR BLD AUTO: 33.6 % (ref 35–48)
HGB BLD-MCNC: 11.5 G/DL (ref 12–16)
IMM GRANULOCYTES # BLD AUTO: 0.54 X10(3) UL (ref 0–1)
IMM GRANULOCYTES NFR BLD: 3.2 %
LYMPHOCYTES # BLD AUTO: 2.36 X10(3) UL (ref 1–4)
LYMPHOCYTES NFR BLD AUTO: 13.9 %
MAGNESIUM SERPL-MCNC: 1.8 MG/DL (ref 1.6–2.6)
MCH RBC QN AUTO: 29.6 PG (ref 26–34)
MCHC RBC AUTO-ENTMCNC: 34.2 G/DL (ref 31–37)
MCV RBC AUTO: 86.4 FL (ref 80–100)
MONOCYTES # BLD AUTO: 0.8 X10(3) UL (ref 0.1–1)
MONOCYTES NFR BLD AUTO: 4.7 %
NEUTROPHILS # BLD AUTO: 12.73 X10 (3) UL (ref 1.5–7.7)
NEUTROPHILS # BLD AUTO: 12.73 X10(3) UL (ref 1.5–7.7)
NEUTROPHILS NFR BLD AUTO: 74.8 %
OSMOLALITY SERPL CALC.SUM OF ELEC: 292 MOSM/KG (ref 275–295)
PLATELET # BLD AUTO: 215 10(3)UL (ref 150–450)
POTASSIUM SERPL-SCNC: 3.1 MMOL/L (ref 3.5–5.1)
POTASSIUM SERPL-SCNC: 3.1 MMOL/L (ref 3.5–5.1)
POTASSIUM SERPL-SCNC: 3.5 MMOL/L (ref 3.5–5.1)
PROT SERPL-MCNC: 4.7 G/DL (ref 5.7–8.2)
RBC # BLD AUTO: 3.89 X10(6)UL (ref 3.8–5.3)
SODIUM SERPL-SCNC: 138 MMOL/L (ref 136–145)
WBC # BLD AUTO: 17 X10(3) UL (ref 4–11)

## 2025-07-21 PROCEDURE — 99233 SBSQ HOSP IP/OBS HIGH 50: CPT | Performed by: HOSPITALIST

## 2025-07-21 PROCEDURE — 71045 X-RAY EXAM CHEST 1 VIEW: CPT | Performed by: INTERNAL MEDICINE

## 2025-07-21 PROCEDURE — 99233 SBSQ HOSP IP/OBS HIGH 50: CPT | Performed by: INTERNAL MEDICINE

## 2025-07-21 PROCEDURE — 74230 X-RAY XM SWLNG FUNCJ C+: CPT | Performed by: HOSPITALIST

## 2025-07-21 RX ORDER — POTASSIUM CHLORIDE 1.5 G/1.58G
40 POWDER, FOR SOLUTION ORAL EVERY 4 HOURS
Status: DISCONTINUED | OUTPATIENT
Start: 2025-07-21 | End: 2025-07-21

## 2025-07-21 RX ORDER — IPRATROPIUM BROMIDE AND ALBUTEROL SULFATE 2.5; .5 MG/3ML; MG/3ML
3 SOLUTION RESPIRATORY (INHALATION) EVERY 6 HOURS PRN
Status: DISCONTINUED | OUTPATIENT
Start: 2025-07-21 | End: 2025-07-23

## 2025-07-21 RX ORDER — MAGNESIUM OXIDE 400 MG/1
400 TABLET ORAL ONCE
Status: COMPLETED | OUTPATIENT
Start: 2025-07-21 | End: 2025-07-21

## 2025-07-21 RX ORDER — POTASSIUM CHLORIDE 14.9 MG/ML
20 INJECTION INTRAVENOUS ONCE
Status: COMPLETED | OUTPATIENT
Start: 2025-07-21 | End: 2025-07-21

## 2025-07-21 NOTE — OCCUPATIONAL THERAPY NOTE
OT orders rcvd; OT attempted to work with pt two times; 1st pt going off the floor for swallow study, 2nd attempt being t/f to PMU; will continue to follow.     Delvis Griffin, Occupational Therapist, OTR/L ext [phone]

## 2025-07-21 NOTE — OCCUPATIONAL THERAPY NOTE
OCCUPATIONAL THERAPY EVALUATION - INPATIENT     Room Number: 521/521-A  Evaluation Date: 7/21/2025  Type of Evaluation: Initial  Presenting Problem: SON    Physician Order: IP Consult to Occupational Therapy  Reason for Therapy: ADL/IADL Dysfunction and Discharge Planning    OCCUPATIONAL THERAPY ASSESSMENT   Patient is a 75 year old female admitted 7/19/2025 for SON; hx of mild cognitive impairment  Prior to admission, patient's baseline is Mod I .  Patient is currently functioning near baseline with self care and basic mobility.  Patient is requiring up to Min A as a result of the following impairments: decreased functional strength, decreased endurance, decreased muscular endurance, and medical status. Occupational Therapy will continue to follow for duration of hospitalization.    Patient will benefit from continued skilled OT Services at discharge to promote prior level of function and safety with additional support and return home with home health OT.    PLAN DURING HOSPITALIZATION  OT Device Recommendations: TBD  OT Treatment Plan: Balance activities, Energy conservation/work simplification techniques, ADL training, Functional transfer training, Endurance training, Patient/Family education, Patient/Family training, Compensatory technique education     OCCUPATIONAL THERAPY MEDICAL/SOCIAL HISTORY   Problem List  Principal Problem:    SON (acute kidney injury)  Active Problems:    Dehydration    Nausea vomiting and diarrhea    Acute cystitis with hematuria    Hypotension due to hypovolemia    Hypovolemic shock (HCC)    HOME SITUATION  Type of Home: [facility]  Home Layout: Two level  Lives With: Spouse  Toilet and Equipment: Standard height toilet  Shower/Tub and Equipment: Tub-shower combo  Patient Regularly Uses: None    SUBJECTIVE  That was harder than I thought it would be.     OCCUPATIONAL THERAPY EXAMINATION      OBJECTIVE  Precautions: Bed/chair alarm  Fall Risk: Standard fall risk      PAIN  ASSESSMENT  Ratin      COGNITION  Mild cognitive impairment at baseline; follows all simple commands; alert, oriented x3; slightly delayed processing   Behavioral/Emotional/Social: Very pleasant, motivated, cooperative     RANGE OF MOTION   Upper extremity ROM is within functional limits     STRENGTH ASSESSMENT  Upper extremity strength is within functional limits     COORDINATION  Gross Motor: WFL   Fine Motor: WFL     ACTIVITIES OF DAILY LIVING ASSESSMENT  AM-PAC ‘6-Clicks’ Inpatient Daily Activity Short Form  How much help from another person does the patient currently need…  -   Putting on and taking off regular lower body clothing?: A Little  -   Bathing (including washing, rinsing, drying)?: A Little  -   Toileting, which includes using toilet, bedpan or urinal? : A Little  -   Putting on and taking off regular upper body clothing?: A Little  -   Taking care of personal grooming such as brushing teeth?: A Little  -   Eating meals?: A Little    AM-PAC Score:  Score: 18  Approx Degree of Impairment: 46.65%  Standardized Score (AM-PAC Scale): 38.66  CMS Modifier (G-Code): CK    FUNCTIONAL TRANSFER ASSESSMENT  Sit to Stand: Edge of Bed  Edge of Bed: Contact Guard Assist  Toilet Transfer: Contact Guard Assist    BED MOBILITY  Rolling: Stand-by Assist  Supine to Sit : Stand-by Assist  Sit to Supine (OT): Stand-by Assist  Scooting: SBA    BALANCE ASSESSMENT  Static Sitting: Stand-by Assist  Static Standing: Contact Guard Assist    FUNCTIONAL ADL ASSESSMENT  Eating: Independent  Grooming Seated: Supervision  Bathing Seated: Minimal Assist  UB Dressing Seated: Stand-by Assist  LB Dressing Seated: Minimal Assist  Toileting Seated: Contact Guard Assist    THERAPEUTIC EXERCISE     Skilled Therapy Provided: Pt rcvd in bed with family at bedside; alert, following all commands; educated on role of OT and goals for session; pt attested to having just returned from bathroom with RN staff; pt completed bed mobility with  SBA; was able to adjust socks at EOB with CGA; stood to RW with CGA; ambulatory >household distances with RW with CGA with intermittent standing rest breaks for pacing and EC; pt returned back to room and left up in chair with all needs in reach; stable at exti; VSS; alarm set; pt is on track to dc home with support; Continue skilled occupational therapy while IP to maximize patient function and increase patient participation, safety, and independence with basic ADL and everyday activities.       EDUCATION PROVIDED  Patient Education : Plan of Care; Role of Occupational Therapy; Discharge Recommendations; Functional Transfer Techniques; Posture/Positioning; Energy Conservation; Proper Body Mechanics  Patient's Response to Education: Verbalized Understanding    The patient's Approx Degree of Impairment: 46.65% has been calculated based on documentation in the [facility] '6 clicks' Inpatient Daily Activity Short Form.  Research supports that patients with this level of impairment may benefit from HH.  Final disposition will be made by interdisciplinary medical team.     Patient End of Session: Up in chair, Call light within reach, Needs met, RN aware of session/findings, Alarm set, Family present    OT Goals  Patients self stated goal is: to return home     Patient will complete functional transfer with SBA  Comment:     Patient will complete toileting with distant supervision   Comment:     Patient will tolerate standing for 3-5 minutes in prep for adls with SBA    Comment:    Patient will complete item retrieval with SBA   Comment:          Goals  on:   Frequency: 3x week     Patient Evaluation Complexity Level:   Occupational Profile/Medical History MODERATE - Expanded review of history including review of medical or therapy record   Specific performance deficits impacting engagement in ADL/IADL MODERATE  3 - 5 performance deficits   Client Assessment/Performance Deficits MODERATE - Comorbidities and min to mod  modifications of tasks    Clinical Decision Making MODERATE - Analysis of occupational profile, detailed assessments, several treatment options    Overall Complexity MODERATE     OT Session Time: 23 minutes  Self-Care Home Management: 0 minutes  Therapeutic Activity: 23 minutes    Delvis Griffin, Occupational Therapist, OTR/L ext [phone]

## 2025-07-21 NOTE — PHYSICAL THERAPY NOTE
PT orders received and chart reviewed. Attempted to see for PT evaluation this morning however patient unavailable; being transported for video swallow study. Will follow up at later date/time as appropriate and able.

## 2025-07-21 NOTE — PAYOR COMM NOTE
--------------  ADMISSION REVIEW     Payor: [employer]  Subscriber #:  [id number]  Authorization Number: [id number]    Admit date: 7/19/25  Admit time:  6:11 PM       ED Provider Notes        History   Patient is a 75-year-old female with past medical history of mild cognitive impairment with memory loss, hyperlipidemia presenting today for evaluation of weakness and fevers.  Patient also endorses nausea, vomiting and diarrhea.  Patient's  provides most of the history.  He states that over the past 5 days, the patient has been having nausea, vomiting and diarrhea.  Symptoms started while in the [place].  Returned home yesterday.  States that she had fevers approximately 3 days ago which have not reoccurred.  Developed a cough today.      ED Triage Vitals [07/19/25 1433]   BP (!) 64/41   Pulse 88   Resp 18   Temp 97.4 °F (36.3 °C)   Temp src Temporal   SpO2 96 %   O2 Device None (Room air)     HENT:      Head: Normocephalic and atraumatic.      Mouth/Throat:      Mouth: Mucous membranes are dry.   Eyes:      Extraocular Movements: Extraocular movements intact.   Cardiovascular:      Rate and Rhythm: Normal rate and regular rhythm.      Heart sounds: Normal heart sounds.   Pulmonary:      Effort: Pulmonary effort is normal. No respiratory distress.      Breath sounds: Normal breath sounds.   Abdominal:      Palpations: Abdomen is soft.      Tenderness: There is no abdominal tenderness.   Skin:     General: Skin is warm and dry.      Capillary Refill: Capillary refill takes less than 2 seconds.      Findings: No rash.   Neurological:      General: No focal deficit present.      Mental Status: She is alert and oriented to person, place, and time.   Psychiatric:         Mood and Affect: Mood normal.         Behavior: Behavior normal.     Labs Reviewed   COMP METABOLIC PANEL (14) - Abnormal; Notable for the following components:       Result Value    Glucose 132 (*)     Sodium 129 (*)     Potassium 3.1 (*)      Chloride 96 (*)     BUN 61 (*)     Creatinine 2.69 (*)     BUN/CREA Ratio 22.7 (*)     eGFR-Cr 18 (*)     Alkaline Phosphatase 171 (*)     Bilirubin, Total 4.0 (*)     All other components within normal limits   CBC WITH DIFFERENTIAL WITH PLATELET - Abnormal; Notable for the following components:    WBC 24.3 (*)     Neutrophil Absolute Prelim 20.41 (*)     Neutrophil Absolute 20.41 (*)     All other components within normal limits   URINALYSIS WITH CULTURE REFLEX - Abnormal; Notable for the following components:    Clarity Urine Turbid (*)     Glucose Urine 50 (*)     Blood Urine 1+ (*)     Protein Urine 100 (*)     Leukocyte Esterase Urine 250 (*)     WBC Urine >50 (*)     RBC Urine 3-5 (*)     Bacteria Urine Rare (*)     Squamous Epi. Cells Few (*)     WBC Clump Present (*)     All other components within normal limits   LACTIC ACID, PLASMA - Abnormal; Notable for the following components:    Lactic Acid 3.1 (*)     All other components within normal limits   RBC MORPHOLOGY SCAN - Abnormal; Notable for the following components:    RBC Morphology See morphology below (*)     All other components within normal limits   ICTOTEST - Abnormal; Notable for the following components:    Ictotest Positive (*)      Disposition and Plan     Clinical Impression:  1. SON (acute kidney injury)    2. Acute cystitis with hematuria    3. Hypotension due to hypovolemia      Disposition:  Admit    History & Physical       HPI:   Talita Horan is a(n) 75 year old female with a history of HL who was just in  and arrived home on Wed who presents to ER with malaise and poor appetite.  Pt had nonbloody diarrhea and n/v for several days in  which then resolved.  Pt was nauseous on the plane home.    Since being home pt has had very poor appetite.  Pt has not had diarrhea or vomiting since being home but has hardly eaten.  In ER SBP in 60s initially.   Pt transferring to ICU for pressors.   No CP or SOB.     Pt found to have UTI  here.   Pt noting cough which started today.        Blood pressure 93/74, pulse 116, temperature 97.4 °F (36.3 °C), temperature source Temporal, resp. rate 23, height 63\", weight 122 lb (55.3 kg), SpO2 99%.     Gen:   A and O x 3  Eyes:  PERRL  Moist mucus membranes.    CV:    RRR.  No m/g/r  Pulm:   CTA bilat  Abd:   +bs, soft, NT, ND  LE:   No c/c/e  Neuro:   nonfocal  Skin:  No rash     Lab Results   Component Value Date     WBC 24.3 (H) 07/19/2025     HGB 14.1 07/19/2025     HCT 39.8 07/19/2025     .0 07/19/2025     CREATSERUM 2.69 (H) 07/19/2025     BUN 61 (H) 07/19/2025      (L) 07/19/2025     K 3.1 (L) 07/19/2025     CL 96 (L) 07/19/2025     CO2 21.0 07/19/2025      (H) 07/19/2025     CA 8.9 07/19/2025     ALB 3.3 07/19/2025     ALKPHO 171 (H) 07/19/2025     BILT 4.0 (H) 07/19/2025     TP 5.7 07/19/2025     AST 21 07/19/2025     ALT 33 07/19/2025   US ABDOMEN LIMITED (CPT=76705)  Result Date: 7/19/2025  IMPRESSION: No acute pathology Electronically Verified and Signed by Attending Radiologist: Francisco Hernandez MD 7/19/2025 6:10 PM Workstation: [email or web address]     CT ABDOMEN+PELVIS(CPT=74176)  Result Date: 7/19/2025  CONCLUSION: 1.  No acute pathology in the abdomen or pelvis. Electronically Verified and Signed by Attending Radiologist: Francisco Hernandez MD 7/19/2025 4:26 PM Workstation: [email or web address]     XR CHEST AP PORTABLE  (CPT=71045)  Result Date: 7/19/2025  CONCLUSION: 1.  No acute findings Electronically Verified and Signed by Attending Radiologist: Francisco Hernandez MD 7/19/2025 4:05 PM Workstation: [email or web address]     EKG 12 Lead  Result Date: 7/19/2025  Atrial fibrillation with rapid ventricular response Nonspecific ST abnormality Abnormal ECG When compared with ECG of 08-FEB-2025 05:26, Atrial fibrillation has replaced Sinus rhythm Borderline criteria for Anterior infarct are no longer Present      Assessment/Plan:      Hypovolemic shock.  Recent gastroenteritis.  Poor appetite.  Severe  dehdyration  SON  - IVF  - levophed  - ICU admit  - d/w intensivits  - stool panel if diarrhea  - follow bmp     Sepsis due to UTI  R/o PNA with cough.  CXR clear.       Elevated lactic acid  Leukocytosis  - IVF and pressors as above  - follow lactic acid  - cont ceftriaxone and azithromycin  - check resp panel  - pulm/critical care on consult  - follow cbc     dvt proph - heparin        7/20/25  Has cough.     Blood pressure 119/56, pulse 109, temperature 98.1 °F (36.7 °C), temperature source Temporal, resp. rate (!) 28, height 63\", weight 134 lb 4.2 oz (60.9 kg), SpO2 95%.     Gen:   A and O x 3  Eyes:  PERRL  Moist mucus membranes.    CV:    RRR.  No m/g/r  Pulm:   CTA bilat  Abd:   +bs, soft, NT, ND  LE:   No c/c/e  Neuro:   nonfocal  Skin:  No rash     Lab Results   Component Value Date     WBC 20.8 (H) 07/20/2025     HGB 12.9 07/20/2025     HCT 36.4 07/20/2025     .0 07/20/2025     CREATSERUM 1.73 (H) 07/20/2025     BUN 46 (H) 07/20/2025      07/20/2025     K 3.3 (L) 07/20/2025     K 3.3 (L) 07/20/2025      07/20/2025     CO2 19.0 (L) 07/20/2025      (H) 07/20/2025     CA 7.8 (L) 07/20/2025     ALB 2.7 (L) 07/20/2025     ALKPHO 146 (H) 07/20/2025     BILT 2.8 (H) 07/20/2025     TP 4.8 (L) 07/20/2025     AST 23 07/20/2025     ALT 29 07/20/2025     TSH 0.680 07/20/2025     MG 2.1 07/19/2025       XR CHEST AP PORTABLE  (CPT=71045)  Result Date: 7/20/2025  CONCLUSION: Findings suggesting increasing fluid volume status of the patient. Increased prominence of the interstitium and small left pleural effusion. Electronically Verified and Signed by Attending Radiologist: Samuel Corona MD 7/20/2025 6:53 AM Workstation: [id number]    Assessment and Plan:      Hypovolemic shock.  Recent gastroenteritis.  Poor appetite.  Severe dehdyration  SON - improving  - IVF  - levophed  - ICU    - stool panel if diarrhea  - follow bmp     Sepsis due to UTI  Possible bronchitis vs PNA       Elevated lactic  acid  Leukocytosis  - IVF and pressors as above  - follow lactic acid  - cont ceftriaxone and azithromycin  - resp panel negative  - pulm/critical care on consult  - follow cbc     New a-fib  - TSH ok  - cardiology on consult  - check echo     dvt proph - heparin        7/21/25  Blood pressure 106/88, pulse 104, temperature 98.3 °F (36.8 °C), temperature source Temporal, resp. rate 20, height 63\", weight 132 lb 11.5 oz (60.2 kg), SpO2 95%.     Gen:  A and O x 3  Eyes:  PERRL  Moist mucus membranes.    CV:    RRR.  No m/g/r  Pulm:   CTA bilat  Abd:   +bs, soft, NT, ND  LE:   No c/c/e  Neuro:   nonfocal  Skin:  No rash     Lab Results   Component Value Date     WBC 17.0 (H) 07/21/2025     HGB 11.5 (L) 07/21/2025     HCT 33.6 (L) 07/21/2025     .0 07/21/2025     CREATSERUM 1.02 07/21/2025     BUN 26 (H) 07/21/2025      07/21/2025     K 3.1 (L) 07/21/2025     K 3.1 (L) 07/21/2025      07/21/2025     CO2 22.0 07/21/2025      (H) 07/21/2025     CA 7.8 (L) 07/21/2025     ALB 2.6 (L) 07/21/2025     ALKPHO 207 (H) 07/21/2025     BILT 2.6 (H) 07/21/2025     TP 4.7 (L) 07/21/2025     AST 31 07/21/2025     ALT 31 07/21/2025     TSH 0.680 07/20/2025     MG 1.8 07/21/2025       XR CHEST AP PORTABLE  (CPT=71045)  Result Date: 7/21/2025  CONCLUSION: Diffuse bilateral interstitial opacity which may reflect edema. Mild patchy bibasilar opacity slightly increased on the right. Electronically Verified and Signed by Attending Radiologist: Rbo Fish MD 7/21/2025 8:20 AM Workstation: [employer]     Assessment and Plan:      Hypovolemic shock.   Recent gastroenteritis.   Poor appetite. - improving  Severe dehdyration   SON   - reduce IVF  - off levophed  - transfer to tele floor  - follow bmp     Sepsis due to UTI - resolved  Possible bronchitis vs PNA       Elevated lactic acid  Leukocytosis - better  - IVF as above  - follow lactic acid  - cont ceftriaxone and azithromycin  - resp panel negative  -  pulm/critical care on consult  - follow cbc     New a-fib  - TSH ok  - cardiology on consult  - f/u echo     dvt proph - heparin           Medications 07/19 07/20 07/21   acetaminophen (Tylenol Extra Strength) tab 1,000 mg  Dose: 1,000 mg  Freq: Every 6 hours PRN Route: OR  PRN Reasons: Fever,Headaches,mild pain  PRN Comment: equal to or greater than 100.4  Start: 07/19/25 1829   Admin Instructions:   do not initiate oral therapy until 6-8 hours after the last IV acetaminophen dose if IV acetaminophen was used previously     10904      21862        azithromycin (Zithromax) 500 mg in sodium chloride 0.9% 250mL IVPB premix  Dose: 500 mg  Freq: Every 24 hours Route: IV  Start: 07/19/25 1830   Admin Instructions:   Consider alternative antibiotic if baseline QTc >500 ms   Order specific questions:       [id number]      52442      1830        benzonatate (Tessalon) cap 200 mg  Dose: 200 mg  Freq: 3 times daily PRN Route: OR  PRN Reason: cough  Start: 07/20/25 1310   Admin Instructions:   Do not crush     [id number]         cefTRIAXone (Rocephin) 1 g in sodium chloride 0.9% 100 mL IVPB-ADDV  Dose: 1 g  Freq: Every 24 hours Route: IV  Start: 07/20/25 1700   Admin Instructions:   Ceftriaxone must NOT be administered simultaneously with calcium containing IV solutions. Includes Y-site as well.  In patients other than neonates ceftriaxone and calcium containing products may administered sequentially, provided the line is flushed in between administrations.   Order specific questions:        [id number]        dextromethorphan-guaiFENesin (Robitussin-DM)  mg/5mL oral solution 10 mL  Dose: 10 mL  Freq: Every 4 hours PRN Route: OR  PRN Reason: cough  Start: 07/20/25 1107   Admin Instructions:   (Maximum dose: guaifenesin 2,400 mg and dextromethorphan 120 mg per day)     09107     80786      82772     233616     103265        heparin (Porcine) 5000 UNIT/ML injection 5,000 Units  Dose: 5,000 Units  Freq: 2 times per day Route:  SC  Start: 07/19/25 2100 201112      269148     773389      558217     2100        ondansetron (Zofran) 4 MG/2ML injection 4 mg  Dose: 4 mg  Freq: Every 6 hours PRN Route: IV  PRN Reasons: Nausea,vomiting  Start: 07/19/25 1829   Admin Instructions:   Default antiemetic sequence (unless otherwise preferred by patient):  1. ondansetron (Zofran) 2. metoclopramide (Reglan)  Wait 15 minutes before proceeding to next medication in sequence.  Follow therapeutic duplication policy.    202716          sodium chloride 0.9% infusion  Rate: 50 mL/hr  Freq: Continuous Route: IV  Start: 07/19/25 1815    [id number]      [id number]      510231     725100           Medications 07/19 07/20 07/21   cefTRIAXone (Rocephin) 2 g in sodium chloride 0.9% 100mL IVPB-JOSEPH  Dose: 2 g  Freq: Once Route: IV  Start: 07/19/25 1653 End: 07/19/25 1739   Admin Instructions:   Ceftriaxone must NOT be administered simultaneously with calcium containing IV solutions. Includes Y-site as well.  In patients other than neonates ceftriaxone and calcium containing products may administered sequentially, provided the line is flushed in between administrations.   Order specific questions:       [id number]     [id number]          lactated ringers IV bolus 1,000 mL  Dose: 1,000 mL  Freq: Once Route: IV  Start: 07/19/25 1701 End: 07/19/25 1818    [id number]     [id number]          magnesium oxide (Mag-Ox) tab 400 mg  Dose: 400 mg  Freq: Once Route: OR  Start: 07/21/25 0615 End: 07/21/25 0627      [id number]        ondansetron (Zofran) 4 MG/2ML injection 4 mg  Dose: 4 mg  Freq: Once Route: IV  Start: 07/19/25 1455 End: 07/19/25 1501    [id number]          potassium chloride (Klor-Con M20) tab 40 mEq  Dose: 40 mEq  Freq: Every 4 hours Route: OR  Start: 07/19/25 1845 End: 07/20/25 0005   Admin Instructions:   Do not crush    201127     (2331) [C]28      266663          potassium chloride 40 mEq in 250mL sodium chloride 0.9% IVPB premix  Dose: 40 mEq  Freq: Once Route: IV  Start: 07/21/25 0500  End: 07/21/25 0925      052538        Followed by   potassium chloride 20 mEq/100mL IVPB premix 20 mEq  Dose: 20 mEq  Freq: Once Route: IV  Start: 07/21/25 0848 End: 07/21/25 1253      [id number]        potassium chloride 40 mEq in 250mL sodium chloride 0.9% IVPB premix  Dose: 40 mEq  Freq: Once Route: IV  Start: 07/20/25 0530 End: 07/20/25 0952     [id number]         sodium chloride 0.9 % IV bolus 1,000 mL  Dose: 1,000 mL  Freq: Once Route: IV  Start: 07/19/25 2130 End: 07/19/25 2200 213033          sodium chloride 0.9 % IV bolus 1,000 mL  Dose: 1,000 mL  Freq: Once Route: IV  Start: 07/19/25 1537 End: 07/19/25 1641    [id number]     [id number]          sodium chloride 0.9 % IV bolus 1,000 mL  Dose: 1,000 mL  Freq: Once Route: IV  Start: 07/19/25 1436 End: 07/19/25 1539    [id number]     140912             Medications 07/19 07/20 07/21   norepinephrine (Levophed) 4 mg/250mL infusion premix  Rate: 1.9-187.5 mL/hr Dose: 0.5-50 mcg/min  Freq: Continuous Route: IV  Start: 07/19/25 1653 End: 07/21/25 0829   Admin Instructions:   Notify prescriber if rate of 30 mcg/min is reached and target parameters are still not at goal.   Order specific questions:       [id number]     [id number]     [id number]     753359     [id number]     671404     802754     428679     233844      167050     298085     399313     679475     192577     173711     017454     097064     877458     007878     512898     627318     666336     60     61     62           Vitals (last day)       Date/Time Temp Pulse Resp BP SpO2 Weight O2 Device O2 Flow Rate (L/min) [facility]    07/21/25 1248 98.3 °F (36.8 °C) 104 20 129/70 96 % -- None (Room air) -- SC    07/21/25 1200 98 °F (36.7 °C) 106 22 124/74 -- -- -- -- CM    07/21/25 0800 98.3 °F (36.8 °C) 104 20 106/88 95 % -- None (Room air) --     07/21/25 0400 98.6 °F (37 °C) 115 21 127/77 95 % -- None (Room air) --     07/21/25 0000 98.9 °F (37.2 °C) 114 22 115/67 94 % -- None (Room air) --     07/20/25 2000 98.2 °F (36.8 °C) 109 24 98/53  95 % -- None (Room air) --     07/20/25 1600 99 °F (37.2 °C) 105 21 -- 95 % -- None (Room air) -- KT    07/20/25 1500 -- 93 20 80/49 95 % -- None (Room air) -- KT    07/20/25 1400 -- 93 18 89/54 93 % -- None (Room air) -- KT    07/20/25 1300 -- 111 26 109/63 98 % -- None (Room air) -- KT    07/20/25 1200 100 °F (37.8 °C) 111 26 104/56 94 % -- None (Room air) -- KT    07/20/25 1000 -- 108 22 63/24 96 % -- None (Room air) -- KT    07/20/25 0900 -- 110 18 97/60 98 % -- None (Room air) --     07/20/25 0800 98.7 °F (37.1 °C) 112 34 123/68 92 % -- None (Room air) -- KT    07/20/25 0700 -- 109 28 119/56 95 % -- None (Room air) --     07/20/25 0600 -- 108 25 127/58 99 % -- None (Room air) --     07/20/25 0500 -- 105 20 102/56 96 % -- None (Room air) --     07/20/25 0400 98.1 °F (36.7 °C) 107 11 107/75 95 % -- None (Room air) --     07/20/25 0000 97 °F (36.1 °C) 100 17 102/60 95 % -- None (Room air) --

## 2025-07-21 NOTE — PROGRESS NOTES
[facility]  part of [facility]    Progress Note    Talita Horan Patient Status:  Inpatient    1950 MRN [id number]   Location [facility] 2W/SW Attending Leandro Higgins MD   Hosp Day # 2 PCP Charley Dickerson MD       Subjective:     Feeling better.  No n/v or diarrhea.  Still with cough.    Objective:   Blood pressure 106/88, pulse 104, temperature 98.3 °F (36.8 °C), temperature source Temporal, resp. rate 20, height 63\", weight 132 lb 11.5 oz (60.2 kg), SpO2 95%.    Gen:   NAD.   A and O x 3  Eyes:  PERRL  Moist mucus membranes.    CV:    RRR.  No m/g/r  Pulm:   CTA bilat  Abd:   +bs, soft, NT, ND  LE:   No c/c/e  Neuro:   nonfocal  Skin:  No rash    Results:     Lab Results   Component Value Date    WBC 17.0 (H) 2025    HGB 11.5 (L) 2025    HCT 33.6 (L) 2025    .0 2025    CREATSERUM 1.02 2025    BUN 26 (H) 2025     2025    K 3.1 (L) 2025    K 3.1 (L) 2025     2025    CO2 22.0 2025     (H) 2025    CA 7.8 (L) 2025    ALB 2.6 (L) 2025    ALKPHO 207 (H) 2025    BILT 2.6 (H) 2025    TP 4.7 (L) 2025    AST 31 2025    ALT 31 2025    TSH 0.680 2025    MG 1.8 2025       XR CHEST AP PORTABLE  (CPT=71045)  Result Date: 2025  CONCLUSION: Diffuse bilateral interstitial opacity which may reflect edema. Mild patchy bibasilar opacity slightly increased on the right. Electronically Verified and Signed by Attending Radiologist: Rob Fish MD 2025 8:20 AM Workstation: [id number]    XR CHEST AP PORTABLE  (CPT=71045)  Result Date: 2025  CONCLUSION: Findings suggesting increasing fluid volume status of the patient. Increased prominence of the interstitium and small left pleural effusion. Electronically Verified and Signed by Attending Radiologist: Samuel Corona MD 2025 6:53 AM Workstation: [id number]    US ABDOMEN  LIMITED (CPT=76705)  Result Date: 7/19/2025  IMPRESSION: No acute pathology Electronically Verified and Signed by Attending Radiologist: Francisco Hernandez MD 7/19/2025 6:10 PM Workstation: [employer]    CT ABDOMEN+PELVIS(CPT=74176)  Result Date: 7/19/2025  CONCLUSION: 1.  No acute pathology in the abdomen or pelvis. Electronically Verified and Signed by Attending Radiologist: Francisco Hernandez MD 7/19/2025 4:26 PM Workstation: [employer]    XR CHEST AP PORTABLE  (CPT=71045)  Result Date: 7/19/2025  CONCLUSION: 1.  No acute findings Electronically Verified and Signed by Attending Radiologist: Francisco Hernandez MD 7/19/2025 4:05 PM Workstation: [employer]    EKG 12 Lead  Result Date: 7/20/2025  Atrial fibrillation with rapid ventricular response Nonspecific ST abnormality Abnormal ECG When compared with ECG of 08-FEB-2025 05:26, Atrial fibrillation has replaced Sinus rhythm Borderline criteria for Anterior infarct are no longer Present Confirmed by CHANG HARRIS, ARABELLA ([id number]) on 7/20/2025 10:54:20 AM      Assessment and Plan:     Hypovolemic shock. - resolved  Recent gastroenteritis. - resolved  Poor appetite. - improving  Severe dehdyration - resolved  SON - resolved  - reduce IVF  - off levophed  - transfer to tele floor  - follow bmp     Sepsis due to UTI - resolved  Possible bronchitis vs PNA       Elevated lactic acid  Leukocytosis - better  - IVF as above  - follow lactic acid  - cont ceftriaxone and azithromycin  - resp panel negative  - pulm/critical care on consult  - follow cbc     New a-fib  - TSH ok  - cardiology on consult  - f/u echo     dvt proph - heparin     Code status - Full    MDM:    High Leandro Huerta MD  7/21/2025

## 2025-07-21 NOTE — SLP NOTE
ADULT VIDEOFLUOROSCOPIC SWALLOWING STUDY    Admission Date: 7/19/2025  Evaluation Date: 07/21/25  Radiologist: Dr. RAPP    RECOMMENDATIONS   Diet Recommendations - Solids: Regular  Diet Recommendations - Liquids: Thin Liquids    Further Follow-up:  Follow Up Needed (Documentation Required): Yes  SLP Follow-up Date: 07/22/25  Compensatory Strategies Recommended: Alternate consistencies, Multiple swallows, Extra sauce/gravy  Aspiration Precautions: Upright position, Slow rate, Small bites, Small sips, No straw  Medication Administration Recommendations: One pill at a time  Treatment Plan/Recommendations: Aspiration precautions    HISTORY   Background/Objective Information:    Problem List  Principal Problem:    SON (acute kidney injury)  Active Problems:    Dehydration    Nausea vomiting and diarrhea    Acute cystitis with hematuria    Hypotension due to hypovolemia    Hypovolemic shock (HCC)      Past Medical History  Past Medical History[1]    Current Diet Consistency: Regular, Thin liquids  Prior Level of Function: Independent  Prior Living Situation: Home with spouse  History of Recent: No recent respiratory difficulty  Precautions: Aspiration  Imaging results:     CXR 7/21/25:  CONCLUSION:   Diffuse bilateral interstitial opacity which may reflect edema.     Mild patchy bibasilar opacity slightly increased on the right.     Electronically Verified and Signed by Attending Radiologist: Rob Fish MD 7/21/2025 8:20 AM   Workstation: [id number]     Reason for Referral: RN dysphagia screen (patient reports pill dysphagia at home)      Family/Patient Goals: Globus sensation with pills     ASSESSMENT   DYSPHAGIA ASSESSMENT  Test completed in conjunction with Radiologist.  Patient Positioned: Upright, Midline.  Patient Viewed: Lateral.  Patient Alertness: Fully alert.  Consistencies Presented: Thin liquids, Puree, Hard solid to assess oropharyngeal swallow function and assess for compensatory strategies to improve  safe swallow function.    THIN LIQUIDS  Method of Presentation: Cup  Oral Phase of Swallow (VFSS - Thin Liquids): Within Functional Limits  Triggered at: Base of tongue, Valleculae  Premature Spillage to: Valleculae  Residue Severity, Location: Trace, Valleculae  Cleared/Reduced with: Secondary swallow  Laryngeal Penetration: During the swallow  Tracheal Aspiration: None  Cough/Throat Clear Response: No  Strategy(ies) Implemented (Thin Liquids): No straws, Slow rate, Alternate consistencies, Small bites and sips, Multiple swallows  Effectiveness: Yes        PUREE  Oral Phase of Swallow (VFSS - Puree): Impaired  Bolus Retrieval (VFSS - Puree): Intact  Bilabial Seal (VFSS - Puree): Intact  Bolus Formation (VFSS - Puree): Intact  Bolus Propulsion (VFSS - Puree): Impaired  Retention (VFSS - Puree): Intact  Triggered at: Base of tongue  Residue Severity, Location: Mild, Valleculae  Cleared/Reduced with: Secondary swallow  Laryngeal Penetration: None  Tracheal Aspiration: None  Cough/Throat Clear Response: No  Strategy(ies) Implemented (Puree): No straws, Slow rate, Alternate consistencies, Small bites and sips, Multple swallows  Effectiveness: Yes     HARD SOLID  Oral Phase of Swallow (VFSS - Hard Solid): Impaired  Bolus Retrieval (VFSS - Hard Solid): Intact  Bilabial Seal (VFSS - Hard Solid): Intact  Bolus Formation (VFSS - Hard Solid): Impaired  Bolus Propulsion (VFSS - Hard Solid): Impaired  Retention (VFSS - Hard Solid): Intact  Triggered at: Base of tongue  Residue Severity, Location: Mild, Valleculae  Cleared/Reduced with: Secondary swallow, Liquid assist  Laryngeal Penetration: None  Tracheal Aspiration: None  Cough/Throat Clear Response: No  Strategy(ies) Implemented (Hard Solid): No straws, Slow rate, Alterante consistencies, Small bites and sips, Multiple swallows  Effectiveness: Yes  Penetration Aspiration Scale Score: Score 2: Material enters the airway, remains above the vocal folds, and is ejected from the  airway       Overall Impression: Pt received alert and oriented, accompanied by  for duration of study. Pt with no complaints of pain, however, reports significantly dry mouth. Pt presents with mild oropharyngeal dysphagia. Oral phase impaired as evidenced by prolonged mastication with increased bolus prep time with puree and hard solids. Intermittent premature spillage to level of valleculae observed with thin liquids. Pharyngeal phase impaired as evidenced by flash penetration x1 on thin liquids and vallecular retention. Barium tablet with thin liquids cleared with no penetration/aspiration or complaints of globus sensation. Trace vallecular retention observed with thin liquids and mild vallecular retention observed with puree/hard solids, cleared with liquid rinse and double swallow. No gross aspiration throughout duration of entire study.     Recommend regular solids (extra sauces/gravy) and thin liquids (no straws). Medications to be administered whole, one at a time with water. Alternate between liquids/solids and complete a double swallow with globus sensation.    Results/recommendations discussed with pt and pt's  extensively in radiology suite. [place] handout with recommendations provided to pt and pt's . Both v/u to all recommendations.       GOALS  Goal #1 Patient will demonstrate safe and efficient clinical tolerance for regular diet and thin liquids without overt signs aspiration/distress x1-2 f/u visits.     In Progress   Goal #2 The patient/family/caregiver will demonstrate understanding and implementation of aspiration precautions and swallow strategies independently over 1-2 session(s).    In Progress     PLAN: SLP to f/u x1 meal assessments, monitor CXR, and continue education.     EDUCATION/INSTRUCTION  Reviewed results and recommendations with patient/family/caregiver.  Agreement/Understanding verbalized and all questions answered to their apparent  satisfaction.    INTERDISCIPLINARY COMMUNICATION  Reviewed results with Radiologist; agreement verbalized.    Patient Experiencing Pain: No    FOLLOW UP  Treatment Plan/Recommendations: Aspiration precautions  Duration: 1 week      Thank you for your referral.   If you have any questions, please contact TERRELL Caraballo M.S. CCC-SLP  Speech Language Pathologist  Phone Number [phone]           [1]   Past Medical History:   Hyperlipidemia    UTI (urinary tract infection)

## 2025-07-21 NOTE — PROGRESS NOTES
[facility]  part of [facility]    Progress Note    Talita Horan Patient Status:  Inpatient    1950 MRN [id number]   Location [facility] 2W/SW Attending Leandro Higgins MD   Hosp Day # 2 PCP Charley Dickerson MD         Subjective:     Constitutional:  Negative for fever.     Patient was seen and examined  Feels better today  Comfortable on room air and less cough  Off Levophed  No abdominal pain or diarrhea  Better appetite  Objective:   Blood pressure 123/73, pulse 106, temperature 98.3 °F (36.8 °C), temperature source Temporal, resp. rate 21, height 5' 3\" (1.6 m), weight 132 lb 11.5 oz (60.2 kg), SpO2 94%.  Physical Exam  Vitals and nursing note reviewed.   Constitutional:       General: She is not in acute distress.  HENT:      Head: Atraumatic.      Nose: Nose normal.      Mouth/Throat:      Mouth: Mucous membranes are moist.   Eyes:      General: No scleral icterus.  Cardiovascular:      Rate and Rhythm: Normal rate.      Heart sounds:      No gallop.   Pulmonary:      Effort: No respiratory distress.      Breath sounds: No stridor. Rales present. No wheezing or rhonchi.      Comments: Good air exchange to both lungs with mild basilar rales  Chest:      Chest wall: No tenderness.   Abdominal:      General: Abdomen is flat. Bowel sounds are normal. There is no distension.      Palpations: Abdomen is soft.      Tenderness: There is no guarding.   Musculoskeletal:      Right lower leg: No edema.      Left lower leg: No edema.   Skin:     General: Skin is dry.   Neurological:      General: No focal deficit present.      Mental Status: She is oriented to person, place, and time.         Results:   Lab Results   Component Value Date    WBC 17.0 (H) 2025    HGB 11.5 (L) 2025    HCT 33.6 (L) 2025    .0 2025    CREATSERUM 1.02 2025    BUN 26 (H) 2025     2025    K 3.1 (L) 2025    K 3.1 (L) 2025      07/21/2025    CO2 22.0 07/21/2025     (H) 07/21/2025    CA 7.8 (L) 07/21/2025    ALB 2.6 (L) 07/21/2025    ALKPHO 207 (H) 07/21/2025    BILT 2.6 (H) 07/21/2025    TP 4.7 (L) 07/21/2025    AST 31 07/21/2025    ALT 31 07/21/2025    TSH 0.680 07/20/2025    MG 1.8 07/21/2025       XR CHEST AP PORTABLE  (CPT=71045)  Result Date: 7/21/2025  CONCLUSION: Diffuse bilateral interstitial opacity which may reflect edema. Mild patchy bibasilar opacity slightly increased on the right. Electronically Verified and Signed by Attending Radiologist: Rob Fish MD 7/21/2025 8:20 AM Workstation: [email or web address]    XR CHEST AP PORTABLE  (CPT=71045)  Result Date: 7/20/2025  CONCLUSION: Findings suggesting increasing fluid volume status of the patient. Increased prominence of the interstitium and small left pleural effusion. Electronically Verified and Signed by Attending Radiologist: Samuel Coorna MD 7/20/2025 6:53 AM Workstation: [email or web address]    US ABDOMEN LIMITED (CPT=76705)  Result Date: 7/19/2025  IMPRESSION: No acute pathology Electronically Verified and Signed by Attending Radiologist: Francisco Hernandez MD 7/19/2025 6:10 PM Workstation: [employer]    CT ABDOMEN+PELVIS(CPT=74176)  Result Date: 7/19/2025  CONCLUSION: 1.  No acute pathology in the abdomen or pelvis. Electronically Verified and Signed by Attending Radiologist: Francisco Hernandez MD 7/19/2025 4:26 PM Workstation: [employer]    XR CHEST AP PORTABLE  (CPT=71045)  Result Date: 7/19/2025  CONCLUSION: 1.  No acute findings Electronically Verified and Signed by Attending Radiologist: Francisco Hernandez MD 7/19/2025 4:05 PM Workstation: [employer]    EKG 12 Lead  Result Date: 7/20/2025  Atrial fibrillation with rapid ventricular response Nonspecific ST abnormality Abnormal ECG When compared with ECG of 08-FEB-2025 05:26, Atrial fibrillation has replaced Sinus rhythm Borderline criteria for Anterior infarct are no longer Present Confirmed by CHANG HARRIS, ARABELLA ([id number]) on 7/20/2025  10:54:20 AM      Assessment & Plan:       1- shock state  /Hypovolemic and septic   Better today and off levophed      -acute gastroenteritis with recent travel to [place] / back on 7/16  - atypical pneumonia         leukocytosis / down trending today   High procalcitonin  BC negative   Mycoplasma serology pending   Negative respiratory panel   Negative legionella and pneumococcal antigen      IV Rocephin and azithromycin      2-acute kidney injury  Better will cut down on IV fluid      3-new onset intermittent A-fib with RVR / now  NSR   Check echo   Cardiology following      4- high bilirubin   Negative abd US   Down trending      5-DVT prophylaxis with heparin subcu    6-cognitive impairment  Supportive care      D/w pt and    D/w staff   Better overall  Okay to transfer to medical floor with remote telemetry              Lauren Barrera MD  7/21/2025

## 2025-07-21 NOTE — PROGRESS NOTES
Cardiology Progress Note    Talita Horan Patient Status:  Inpatient    1950 MRN [id number]   Location [facility] 2W/SW Attending Leandro Higgins MD   Hosp Day # 2 PCP Charley Dickerson MD     Interval Note:  Patient seen and examined  No acute issues overnight      --------------------------------------------------------------------------------------------------------------------------------  ROS 12 systems reviewed, pertinent findings above.  ROS    History:  Past Medical History[1]  Past Surgical History[2]  Family History[3]   reports that she has never smoked. She has never been exposed to tobacco smoke. She has never used smokeless tobacco. She reports that she does not currently use alcohol. She reports that she does not use drugs.    Objective:   Temp: 98.3 °F (36.8 °C)  Pulse: 106  Resp: 21  BP: 123/73    Intake/Output:     Intake/Output Summary (Last 24 hours) at 2025 1020  Last data filed at 2025 0910  Gross per 24 hour   Intake 2465 ml   Output 1850 ml   Net 615 ml       Physical Exam:    General: Alert and oriented x 3, no acute distress  HEENT: Normocephalic, anicteric sclera, neck supple.  Neck: No JVD, carotids 2+, no bruits.  Cardiac: Regular rate and rhythm. S1, S2 normal. No murmur, pericardial rub, S3.  Lungs: Clear without wheezes, rales, rhonchi or dullness.  Normal excursions and effort.  Abdomen: Soft, non-tender. BS-present.  Extremities: Without clubbing, cyanosis or edema.  Peripheral pulses are 2+.  Neurologic: Non-focal  Skin: Warm and dry.       Assessment   Sepsis  SON  Paroxysmal atrial fibrillation  Spontaneously converted to sinus  Echo pending      Plan  Paroxysmal, provoked atrial fibrillation due to SON and sepsis.  Maintaining sinus rhythm  Echo pending        Level of care: L***    Emerita Chao DO  [facility]   Interventional Cardiac and Vascular Services      Emerita Chao DO  2025  10:20 AM         [1]   Past  Medical History:   Hyperlipidemia    UTI (urinary tract infection)   [2]   Past Surgical History:  Procedure Laterality Date    Appendectomy  2025    Laparoscopic appendectomy    Colonoscopy      Knee surgery Right           Tonsillectomy     [3]   Family History  Problem Relation Age of Onset    Cancer Mother     Breast Cancer Mother     Cancer Father     Breast Cancer Maternal Grandmother

## 2025-07-21 NOTE — CM/SW NOTE
Anticipated therapy need: Home with Home Healthcare.    F2f and HH referrals started in [place].  If agreeable, will need list.    / to remain available for support and/or discharge planning.      Itzel Garcia MBA BSN RN CRRSELWYN MURILLO  RN Case Manager [facility]U

## 2025-07-21 NOTE — PROGRESS NOTES
Patient to transfer to [facility], report given to Rola CHINCHILLA. Patient and family updated. Belongings gathered and sent with patient.     Double RN skin check done prior to transfer off Unit. Skin check performed by this RN and KEANU Maldonado.     Wounds are as follows: none.     Will remain available for any further questions or concerns.

## 2025-07-21 NOTE — PLAN OF CARE
Problem: Patient Centered Care  Goal: Patient preferences are identified and integrated in the patient's plan of care  Description: Interventions:  - What would you like us to know as we care for you?   - Provide timely, complete, and accurate information to patient/family  - Incorporate patient and family knowledge, values, beliefs, and cultural backgrounds into the planning and delivery of care  - Encourage patient/family to participate in care and decision-making at the level they choose  - Honor patient and family perspectives and choices  Outcome: Progressing      Problem: PAIN - ADULT  Goal: Verbalizes/displays adequate comfort level or patient's stated pain goal  Description: INTERVENTIONS:  - Encourage pt to monitor pain and request assistance  - Assess pain using appropriate pain scale  - Administer analgesics based on type and severity of pain and evaluate response  - Implement non-pharmacological measures as appropriate and evaluate response  - Consider cultural and social influences on pain and pain management  - Manage/alleviate anxiety  - Utilize distraction and/or relaxation techniques  - Monitor for opioid side effects  - Notify MD/LIP if interventions unsuccessful or patient reports new pain  - Anticipate increased pain with activity and pre-medicate as appropriate  Outcome: Progressing     Problem: RISK FOR INFECTION - ADULT  Goal: Absence of fever/infection during anticipated neutropenic period  Description: INTERVENTIONS  - Monitor WBC  - Administer growth factors as ordered  - Implement neutropenic guidelines  Outcome: Progressing     Problem: SAFETY ADULT - FALL  Goal: Free from fall injury  Description: INTERVENTIONS:  - Assess pt frequently for physical needs  - Identify cognitive and physical deficits and behaviors that affect risk of falls.  - [place] fall precautions as indicated by assessment.  - Educate pt/family on patient safety including physical limitations  - Instruct pt to call  for assistance with activity based on assessment  - Modify environment to reduce risk of injury  - Provide assistive devices as appropriate  - Consider OT/PT consult to assist with strengthening/mobility  - Encourage toileting schedule  Outcome: Progressing     Problem: DISCHARGE PLANNING  Goal: Discharge to home or other facility with appropriate resources  Description: INTERVENTIONS:  - Identify barriers to discharge w/pt and caregiver  - Include patient/family/discharge partner in discharge planning  - Arrange for needed discharge resources and transportation as appropriate  - Identify discharge learning needs (meds, wound care, etc)  - Arrange for interpreters to assist at discharge as needed  - Consider post-discharge preferences of patient/family/discharge partner  - Complete POLST form as appropriate  - Assess patient's ability to be responsible for managing their own health  - Refer to Case Management Department for coordinating discharge planning if the patient needs post-hospital services based on physician/LIP order or complex needs related to functional status, cognitive ability or social support system  Outcome: Progressing     Problem: RESPIRATORY - ADULT  Goal: Achieves optimal ventilation and oxygenation  Description: INTERVENTIONS:  - Assess for changes in respiratory status  - Assess for changes in mentation and behavior  - Position to facilitate oxygenation and minimize respiratory effort  - Oxygen supplementation based on oxygen saturation or ABGs  - Provide Smoking Cessation handout, if applicable  - Encourage broncho-pulmonary hygiene including cough, deep breathe, Incentive Spirometry  - Assess the need for suctioning and perform as needed  - Assess and instruct to report SOB or any respiratory difficulty  - Respiratory Therapy support as indicated  - Manage/alleviate anxiety  - Monitor for signs/symptoms of CO2 retention  Outcome: Progressing     Problem: CARDIOVASCULAR - ADULT  Goal:  Maintains optimal cardiac output and hemodynamic stability  Description: INTERVENTIONS:  - Monitor vital signs, rhythm, and trends  - Monitor for bleeding, hypotension and signs of decreased cardiac output  - Evaluate effectiveness of vasoactive medications to optimize hemodynamic stability  - Monitor arterial and/or venous puncture sites for bleeding and/or hematoma  - Assess quality of pulses, skin color and temperature  - Assess for signs of decreased coronary artery perfusion - ex. Angina  - Evaluate fluid balance, assess for edema, trend weights  Outcome: Progressing  Goal: Absence of cardiac arrhythmias or at baseline  Description: INTERVENTIONS:  - Continuous cardiac monitoring, monitor vital signs, obtain 12 lead EKG if indicated  - Evaluate effectiveness of antiarrhythmic and heart rate control medications as ordered  - Initiate emergency measures for life threatening arrhythmias  - Monitor electrolytes and administer replacement therapy as ordered  Outcome: Progressing     Received patient from CCU in a stable condition.  Vital signs are stable. Denies pain or discomfort.  Potassium replaced for protocol.  IVF infusing. Wound care provided.  Safety precautions in place. Family at bedside.

## 2025-07-21 NOTE — PLAN OF CARE
Problem: Patient Centered Care  Goal: Patient preferences are identified and integrated in the patient's plan of care  Description: Interventions:  - What would you like us to know as we care for you?   - Provide timely, complete, and accurate information to patient/family  - Incorporate patient and family knowledge, values, beliefs, and cultural backgrounds into the planning and delivery of care  - Encourage patient/family to participate in care and decision-making at the level they choose  - Honor patient and family perspectives and choices  Outcome: Progressing     Problem: PAIN - ADULT  Goal: Verbalizes/displays adequate comfort level or patient's stated pain goal  Description: INTERVENTIONS:  - Encourage pt to monitor pain and request assistance  - Assess pain using appropriate pain scale  - Administer analgesics based on type and severity of pain and evaluate response  - Implement non-pharmacological measures as appropriate and evaluate response  - Consider cultural and social influences on pain and pain management  - Manage/alleviate anxiety  - Utilize distraction and/or relaxation techniques  - Monitor for opioid side effects  - Notify MD/LIP if interventions unsuccessful or patient reports new pain  - Anticipate increased pain with activity and pre-medicate as appropriate  Outcome: Progressing     Problem: RISK FOR INFECTION - ADULT  Goal: Absence of fever/infection during anticipated neutropenic period  Description: INTERVENTIONS  - Monitor WBC  - Administer growth factors as ordered  - Implement neutropenic guidelines  Outcome: Progressing     Problem: SAFETY ADULT - FALL  Goal: Free from fall injury  Description: INTERVENTIONS:  - Assess pt frequently for physical needs  - Identify cognitive and physical deficits and behaviors that affect risk of falls.  - [place] fall precautions as indicated by assessment.  - Educate pt/family on patient safety including physical limitations  - Instruct pt to call  for assistance with activity based on assessment  - Modify environment to reduce risk of injury  - Provide assistive devices as appropriate  - Consider OT/PT consult to assist with strengthening/mobility  - Encourage toileting schedule  Outcome: Progressing     Problem: DISCHARGE PLANNING  Goal: Discharge to home or other facility with appropriate resources  Description: INTERVENTIONS:  - Identify barriers to discharge w/pt and caregiver  - Include patient/family/discharge partner in discharge planning  - Arrange for needed discharge resources and transportation as appropriate  - Identify discharge learning needs (meds, wound care, etc)  - Arrange for interpreters to assist at discharge as needed  - Consider post-discharge preferences of patient/family/discharge partner  - Complete POLST form as appropriate  - Assess patient's ability to be responsible for managing their own health  - Refer to Case Management Department for coordinating discharge planning if the patient needs post-hospital services based on physician/LIP order or complex needs related to functional status, cognitive ability or social support system  Outcome: Progressing     Problem: RESPIRATORY - ADULT  Goal: Achieves optimal ventilation and oxygenation  Description: INTERVENTIONS:  - Assess for changes in respiratory status  - Assess for changes in mentation and behavior  - Position to facilitate oxygenation and minimize respiratory effort  - Oxygen supplementation based on oxygen saturation or ABGs  - Provide Smoking Cessation handout, if applicable  - Encourage broncho-pulmonary hygiene including cough, deep breathe, Incentive Spirometry  - Assess the need for suctioning and perform as needed  - Assess and instruct to report SOB or any respiratory difficulty  - Respiratory Therapy support as indicated  - Manage/alleviate anxiety  - Monitor for signs/symptoms of CO2 retention  Outcome: Progressing     Problem: CARDIOVASCULAR - ADULT  Goal:  Maintains optimal cardiac output and hemodynamic stability  Description: INTERVENTIONS:  - Monitor vital signs, rhythm, and trends  - Monitor for bleeding, hypotension and signs of decreased cardiac output  - Evaluate effectiveness of vasoactive medications to optimize hemodynamic stability  - Monitor arterial and/or venous puncture sites for bleeding and/or hematoma  - Assess quality of pulses, skin color and temperature  - Assess for signs of decreased coronary artery perfusion - ex. Angina  - Evaluate fluid balance, assess for edema, trend weights  Outcome: Progressing  Goal: Absence of cardiac arrhythmias or at baseline  Description: INTERVENTIONS:  - Continuous cardiac monitoring, monitor vital signs, obtain 12 lead EKG if indicated  - Evaluate effectiveness of antiarrhythmic and heart rate control medications as ordered  - Initiate emergency measures for life threatening arrhythmias  - Monitor electrolytes and administer replacement therapy as ordered  Outcome: Progressing

## 2025-07-22 ENCOUNTER — APPOINTMENT (OUTPATIENT)
Dept: CT IMAGING | Facility: HOSPITAL | Age: 75
End: 2025-07-22
Attending: INTERNAL MEDICINE

## 2025-07-22 LAB
ANION GAP SERPL CALC-SCNC: 7 MMOL/L (ref 0–18)
ATRIAL RATE: 110 BPM
BASOPHILS # BLD: 0 X10(3) UL (ref 0–0.2)
BASOPHILS NFR BLD: 0 %
BNP SERPL-MCNC: 589 PG/ML (ref ?–100)
BUN BLD-MCNC: 16 MG/DL (ref 9–23)
BUN/CREAT SERPL: 23.5 (ref 10–20)
CALCIUM BLD-MCNC: 7.4 MG/DL (ref 8.7–10.4)
CHLORIDE SERPL-SCNC: 108 MMOL/L (ref 98–112)
CO2 SERPL-SCNC: 23 MMOL/L (ref 21–32)
CREAT BLD-MCNC: 0.68 MG/DL (ref 0.55–1.02)
D DIMER PPP FEU-MCNC: 2.32 UG/ML FEU (ref ?–0.74)
DEPRECATED RDW RBC AUTO: 42.5 FL (ref 35.1–46.3)
EGFRCR SERPLBLD CKD-EPI 2021: 91 ML/MIN/1.73M2 (ref 60–?)
EOSINOPHIL # BLD: 0.72 X10(3) UL (ref 0–0.7)
EOSINOPHIL NFR BLD: 5 %
ERYTHROCYTE [DISTWIDTH] IN BLOOD BY AUTOMATED COUNT: 13.5 % (ref 11–15)
ERYTHROCYTE [SEDIMENTATION RATE] IN BLOOD: 62 MM/HR (ref 0–30)
GLUCOSE BLD-MCNC: 107 MG/DL (ref 70–99)
HCT VFR BLD AUTO: 30.1 % (ref 35–48)
HGB BLD-MCNC: 10.6 G/DL (ref 12–16)
LYMPHOCYTES NFR BLD: 17 %
LYMPHOCYTES NFR BLD: 2.43 X10(3) UL (ref 1–4)
MAGNESIUM SERPL-MCNC: 1.7 MG/DL (ref 1.6–2.6)
MCH RBC QN AUTO: 30.3 PG (ref 26–34)
MCHC RBC AUTO-ENTMCNC: 35.2 G/DL (ref 31–37)
MCV RBC AUTO: 86 FL (ref 80–100)
MONOCYTES # BLD: 0.57 X10(3) UL (ref 0.1–1)
MONOCYTES NFR BLD: 4 %
MORPHOLOGY: NORMAL
NEUTROPHILS # BLD AUTO: 9.61 X10 (3) UL (ref 1.5–7.7)
NEUTROPHILS NFR BLD: 74 %
NEUTS HYPERSEG # BLD: 10.58 X10(3) UL (ref 1.5–7.7)
OSMOLALITY SERPL CALC.SUM OF ELEC: 288 MOSM/KG (ref 275–295)
P AXIS: 60 DEGREES
P-R INTERVAL: 160 MS
PLATELET # BLD AUTO: 226 10(3)UL (ref 150–450)
PLATELET MORPHOLOGY: NORMAL
POTASSIUM SERPL-SCNC: 3.6 MMOL/L (ref 3.5–5.1)
POTASSIUM SERPL-SCNC: 3.6 MMOL/L (ref 3.5–5.1)
PROCALCITONIN SERPL-MCNC: 0.84 NG/ML (ref ?–0.05)
Q-T INTERVAL: 336 MS
QRS DURATION: 88 MS
QTC CALCULATION (BEZET): 454 MS
R AXIS: 17 DEGREES
RBC # BLD AUTO: 3.5 X10(6)UL (ref 3.8–5.3)
SODIUM SERPL-SCNC: 138 MMOL/L (ref 136–145)
T AXIS: 42 DEGREES
TOTAL CELLS COUNTED BLD: 100
TROPONIN I SERPL HS-MCNC: 8 NG/L (ref ?–34)
VENTRICULAR RATE: 110 BPM
WBC # BLD AUTO: 14.3 X10(3) UL (ref 4–11)

## 2025-07-22 PROCEDURE — 99233 SBSQ HOSP IP/OBS HIGH 50: CPT | Performed by: INTERNAL MEDICINE

## 2025-07-22 PROCEDURE — 71260 CT THORAX DX C+: CPT | Performed by: INTERNAL MEDICINE

## 2025-07-22 PROCEDURE — 99233 SBSQ HOSP IP/OBS HIGH 50: CPT | Performed by: HOSPITALIST

## 2025-07-22 RX ORDER — MAGNESIUM OXIDE 400 MG/1
400 TABLET ORAL ONCE
Status: COMPLETED | OUTPATIENT
Start: 2025-07-22 | End: 2025-07-22

## 2025-07-22 RX ORDER — PREDNISONE 10 MG/1
10 TABLET ORAL ONCE
Status: COMPLETED | OUTPATIENT
Start: 2025-07-22 | End: 2025-07-22

## 2025-07-22 RX ORDER — OXYCODONE HYDROCHLORIDE 5 MG/1
2.5 TABLET ORAL EVERY 4 HOURS PRN
Refills: 0 | Status: DISCONTINUED | OUTPATIENT
Start: 2025-07-22 | End: 2025-07-22

## 2025-07-22 NOTE — CM/SW NOTE
07/22/25 1500   Discharge Needs   Anticipated D/C needs Home health care   Choice of Post-Acute Provider   Informed patient of right to choose their preferred provider Yes   List of appropriate post-acute services provided to patient/family with quality data Yes   Information given to Patient;Spouse/Significant other     CM met with patient and spouse at bedside to discuss discharge planning.    Anticipated therapy need: Home with Home Healthcare. Patient and spouse agreeable to home healthcare. CM provided patient with list of potential agencies. CM to follow up to receive choice of agency.    Plan: Discharge home with home health, pending choice and clinical course.    / to remain available for support and/or discharge planning.   Rani Welch, MARICARMENN, RN  Nurse   [phone]

## 2025-07-22 NOTE — PHYSICAL THERAPY NOTE
PHYSICAL THERAPY EVALUATION - INPATIENT     Room Number: 521/521-A  Evaluation Date: 7/22/2025  Type of Evaluation: Initial   Physician Order: PT Eval and Treat    Presenting Problem: SON, dehydration     Reason for Therapy: Mobility Dysfunction and Discharge Planning    PHYSICAL THERAPY ASSESSMENT   Patient is a 75 year old female admitted 7/19/2025 for  SON, dehydration.  Prior to admission, patient's baseline is completely I without ad.  Patient is currently functioning below baseline with bed mobility, transfers, gait, stair negotiation, standing prolonged periods, and performing household tasks.  Patient is requiring stand-by assist and contact guard assist as a result of the following impairments: decreased functional strength, decreased endurance/aerobic capacity, impaired standing balance, decreased muscular endurance, and medical status.  Physical Therapy will continue to follow for duration of hospitalization.    Patient will benefit from continued skilled PT Services at discharge to promote prior level of function and safety with additional support and return home with home health PT.    PLAN DURING HOSPITALIZATION  Nursing Mobility Recommendation : 1 Assist  PT Device Recommendation: Rolling walker  PT Treatment Plan: Bed mobility, Patient education, Gait training, Neuromuscular re-educate, Stair training, Transfer training  Rehab Potential : Fair  Frequency (Obs): 5x/week     PHYSICAL THERAPY MEDICAL/SOCIAL HISTORY   History related to current admission:  SON, dehydration        Problem List  Principal Problem:    SON (acute kidney injury)  Active Problems:    Dehydration    Nausea vomiting and diarrhea    Acute cystitis with hematuria    Hypotension due to hypovolemia    Hypovolemic shock (HCC)      HOME SITUATION  Type of Home: House  Home Layout: Two level  Stairs to Enter : 2   Railing: No    Stairs to Bedroom: 14    Railing: Yes (1)    Lives With: Spouse    Drives: Yes   Patient Regularly Uses:  None (but owns a RW)     Prior Level of [place]: completely I without ad    SUBJECTIVE  \" I feel better than yesterday\"     PHYSICAL THERAPY EXAMINATION   OBJECTIVE  Precautions: Bed/chair alarm  Fall Risk: Standard fall risk            COGNITION  Overall Cognitive Status:  WFL - within functional limits    RANGE OF MOTION AND STRENGTH ASSESSMENT  Upper extremity ROM and strength are within functional limits B UE  Lower extremity ROM is within functional limits B LE  Lower extremity strength is within functional limits B LE    BALANCE  Static Sitting: Fair +  Dynamic Sitting: Fair  Static Standing: Fair -  Dynamic Standing: Poor +         AM-PAC '6-Clicks' INPATIENT SHORT FORM - BASIC MOBILITY  How much difficulty does the patient currently have...  Patient Difficulty: Turning over in bed (including adjusting bedclothes, sheets and blankets)?: A Little   Patient Difficulty: Sitting down on and standing up from a chair with arms (e.g., wheelchair, bedside commode, etc.): A Little   Patient Difficulty: Moving from lying on back to sitting on the side of the bed?: A Little   How much help from another person does the patient currently need...   Help from Another: Moving to and from a bed to a chair (including a wheelchair)?: A Little   Help from Another: Need to walk in hospital room?: A Little   Help from Another: Climbing 3-5 steps with a railing?: Total     AM-PAC Score:  Raw Score: 16   Approx Degree of Impairment: 54.16%   Standardized Score (AM-PAC Scale): 40.78   CMS Modifier (G-Code): CK    FUNCTIONAL ABILITY STATUS  Functional Mobility/Gait Assessment  Gait Assistance: Contact guard assist  Distance (ft): 25 (+ 50ft)  Assistive Device: Rolling walker  Pattern:  (decreased  step/stride length, decreased foot/toe clearance)  Rolling: stand-by assist and contact guard assist  Supine to Sit: stand-by assist and contact guard assist  Sit to Supine: stand-by assist and contact guard assist  Sit to Stand:  contact guard assist    Exercise/Education Provided:  Bed mobility  Gait training  Neuromuscular re-educate  Transfer training    Skilled Therapy Provided: Pt was cleared to participate with PT per RN. Pt was in the bathroom with PCT upon arrival, spouse at bedside, pt agreeable to participate. PT then assumed the care from here with vc provided as to safety techniques when completing STS from toilet seat to RW with CGA. Pt was able to maintain standing with 1 hand support to unsupported while completing hand hygiene in front of the bathroom sink with vc as to postural awareness, Pt was able to complete bed mob with sit <> supine at SBA/CGA and was able to ambulate x 25ft, 50ft with RW with CGA. Discussed PT dc rec which will be home with  PT with pt verbalized understanding and in agreement. PT then assisted pt to sit down in the w/c as transporter is here to  for CT scan and reported to RN as to pt's functional performance and dc rec.    The patient's Approx Degree of Impairment: 54.16% has been calculated based on documentation in the [facility] '6 clicks' Inpatient Basic Mobility Short Form.  Research supports that patients with this level of impairment may benefit from  PT.  Final disposition will be made by interdisciplinary medical team.    Patient End of Session: With  staff (in the w/c ready to be transported by  staff for CT scan)    CURRENT GOALS  Goals to be met by: 8/5  Patient Goal Patient's self-stated goal is: home   Goal #1 Patient is able to demonstrate supine - sit EOB @ level: modified independent     Goal #1   Current Status    Goal #2 Patient is able to demonstrate transfers EOB to/from Chair/Wheelchair at assistance level: modified independent with walker - rolling     Goal #2  Current Status    Goal #3 Patient is able to ambulate 150 feet with assist device: walker - rolling at assistance level: modified independent   Goal #3   Current Status    Goal #4 Patient will negotiate 14  stairs/one curb w/ assistive device and supervision   Goal #4   Current Status    Goal #5 Patient to demonstrate independence with home activity/exercise instructions provided to patient in preparation for discharge.   Goal #5   Current Status    Goal #6    Goal #6  Current Status      Patient Evaluation Complexity Level:  History Moderate - 1 or 2 personal factors and/or co-morbidities   Examination of body systems Low -  addressing 1-2 elements   Clinical Presentation Low- Stable   Clinical Decision Making  Low Complexity     Gait Training: 15 minutes  Therapeutic Activity:  10 minutes      Dyana Orona, PT, DPT

## 2025-07-22 NOTE — PROGRESS NOTES
[facility]  part of [facility]    Progress Note    Talita Horan Patient Status:  Inpatient    1950 MRN [id number]   Location [facility]5W Attending Leandro Higgins MD   Hosp Day # 3 PCP Charley Dickerson MD       Subjective:     Constitutional:  Positive for fatigue. Negative for fever.   Respiratory:  Positive for cough and chest tightness.    Cardiovascular:  Negative for palpitations and leg swelling.   Gastrointestinal: Negative.    Psychiatric/Behavioral: Negative.       Significant cough left-sided pleuritic pain  No fever  Up to the chair on room air and claimed not feeling well  Objective:   Blood pressure 116/69, pulse 98, temperature 98.4 °F (36.9 °C), temperature source Oral, resp. rate 16, height 5' 3\" (1.6 m), weight 132 lb 11.5 oz (60.2 kg), SpO2 95%.  Physical Exam  Constitutional:       General: She is not in acute distress.  HENT:      Head: Atraumatic.      Nose: Nose normal.      Mouth/Throat:      Mouth: Mucous membranes are moist.   Eyes:      General: No scleral icterus.  Cardiovascular:      Rate and Rhythm: Normal rate.      Heart sounds:      No gallop.   Pulmonary:      Effort: No respiratory distress.      Breath sounds: No stridor. Rales present. No wheezing or rhonchi.   Chest:      Chest wall: No tenderness.   Abdominal:      General: Abdomen is flat. Bowel sounds are normal. There is no distension.      Palpations: Abdomen is soft.      Tenderness: There is no guarding.   Musculoskeletal:      Right lower leg: No edema.      Left lower leg: No edema.   Lymphadenopathy:      Cervical: No cervical adenopathy.   Skin:     General: Skin is dry.   Neurological:      Mental Status: She is oriented to person, place, and time.         Results:   Lab Results   Component Value Date    WBC 14.3 (H) 2025    HGB 10.6 (L) 2025    HCT 30.1 (L) 2025    .0 2025    CREATSERUM 0.68 2025    BUN 16 2025      07/22/2025    K 3.6 07/22/2025    K 3.6 07/22/2025     07/22/2025    CO2 23.0 07/22/2025     (H) 07/22/2025    CA 7.4 (L) 07/22/2025    ALB 2.6 (L) 07/21/2025    ALKPHO 207 (H) 07/21/2025    BILT 2.6 (H) 07/21/2025    TP 4.7 (L) 07/21/2025    AST 31 07/21/2025    ALT 31 07/21/2025    TSH 0.680 07/20/2025    DDIMER 2.32 (H) 07/22/2025    ESRML 62 (H) 07/22/2025    MG 1.7 07/22/2025    TROPHS 8 07/22/2025       XR VIDEO SWALLOW (CPT=74230)  Result Date: 7/21/2025  FINDINGS: Impression: Negative for aspiration. Refer to speech therapy assessment and plan. Electronically Verified and Signed by Attending Radiologist: Durga Savage MD 7/21/2025 12:44 PM Workstation: [email or web address]    XR CHEST AP PORTABLE  (CPT=71045)  Result Date: 7/21/2025  CONCLUSION: Diffuse bilateral interstitial opacity which may reflect edema. Mild patchy bibasilar opacity slightly increased on the right. Electronically Verified and Signed by Attending Radiologist: Rob Fish MD 7/21/2025 8:20 AM Workstation: [id number]    EKG 12 Lead  Result Date: 7/22/2025  Sinus tachycardia Otherwise normal ECG When compared with ECG of 19-JUL-2025 15:45, Sinus rhythm has replaced Atrial fibrillation ST no longer depressed in Anterior leads Confirmed by CHANG JIEMNEZ, DAVINA ([id number]) on 7/22/2025 8:24:11 AM      Assessment & Plan:       1- s/p shock state  /Hypovolemic and septic   Better off levophed / HD stable      -acute gastroenteritis with recent travel to [place] / back on 7/16  - acute pneumonia  / atypical         leukocytosis / down trending today   High procalcitonin  BC negative   Mycoplasma serology pending   Negative respiratory panel   Negative legionella and pneumococcal antigen      IV Rocephin and azithromycin     2-pleuritic chest pain with increased cough  Check D-dimer if positive to check CT rule out PE  Follow-up procalcitonin     3-acute kidney injury  Resolved with IV fluid     4-new onset intermittent A-fib with RVR / now  NSR    Normal echo     5- high bilirubin   Negative abd US   Down trending      6-DVT prophylaxis with heparin subcu      D/w    D/w staff      6-cognitive impairment  Supportive care                  Lauren Barrera MD  7/22/2025

## 2025-07-22 NOTE — PROGRESS NOTES
[facility]  part of [facility]    Progress Note    Talita Horan Patient Status:  Inpatient    1950 MRN [id number]   Location [facility]5W Attending Leandro Higgins MD   Hosp Day # 3 PCP Charley Dickerson MD       Subjective:     Pt sleepy this am after a dose of oxycodone last night.  Did not eat breakfast this am.  Pt had chest pain last night.    Objective:   Blood pressure 136/83, pulse 114, temperature 97.9 °F (36.6 °C), temperature source Oral, resp. rate 18, height 63\", weight 132 lb 11.5 oz (60.2 kg), SpO2 96%.    Gen:   NAD.   A and O x 3  Eyes:  PERRL  Moist mucus membranes.    CV:    RRR.  No m/g/r  Pulm:   CTA bilat  Abd:   +bs, soft, NT, ND  LE:   No c/c/e  Neuro:   nonfocal  Skin:  No rash    Results:     Lab Results   Component Value Date    WBC 14.3 (H) 2025    HGB 10.6 (L) 2025    HCT 30.1 (L) 2025    .0 2025    CREATSERUM 0.68 2025    BUN 16 2025     2025    K 3.6 2025    K 3.6 2025     2025    CO2 23.0 2025     (H) 2025    CA 7.4 (L) 2025    ALB 2.6 (L) 2025    ALKPHO 207 (H) 2025    BILT 2.6 (H) 2025    TP 4.7 (L) 2025    AST 31 2025    ALT 31 2025    TSH 0.680 2025    DDIMER 2.32 (H) 2025    ESRML 62 (H) 2025    MG 1.7 2025       XR VIDEO SWALLOW (CPT=74230)  Result Date: 2025  FINDINGS: Impression: Negative for aspiration. Refer to speech therapy assessment and plan. Electronically Verified and Signed by Attending Radiologist: Durga Savage MD 2025 12:44 PM Workstation: [email or web address]    XR CHEST AP PORTABLE  (CPT=71045)  Result Date: 2025  CONCLUSION: Diffuse bilateral interstitial opacity which may reflect edema. Mild patchy bibasilar opacity slightly increased on the right. Electronically Verified and Signed by Attending Radiologist: Rob Fish MD 2025 8:20 AM  Workstation: [id number]    EKG 12 Lead  Result Date: 7/22/2025  Sinus tachycardia Otherwise normal ECG When compared with ECG of 19-JUL-2025 15:45, Sinus rhythm has replaced Atrial fibrillation ST no longer depressed in Anterior leads Confirmed by CHANG JIMENEZ, GHOSH ([id number]) on 7/22/2025 8:24:11 AM      Assessment and Plan:     Hypovolemic shock. - resolved  Recent gastroenteritis. - resolved  Poor appetite. - improving  Severe dehdyration - resolved  SON - resolved  - off IVF  - off levophed  - follow bmp     Sepsis due to UTI - resolved  Possible bronchitis vs PNA       Elevated lactic acid  Leukocytosis - better  - cont ceftriaxone and azithromycin  - resp panel negative  - pulm/critical care on consult  - follow cbc    Somnolence  Due to opiate last night.  - stop oxycodone    Chest pain  Probable pleurisy due to bronchitis/pna  - trop and EKG ok  - tylenol prn  - pulm on consult  - d-dimer elevated.     Will discuss CT chest with pulm.    Poor appetite  - give prednisone 10 mg x 1     New a-fib  - TSH ok  - cardiology on consult - signed off  - echo ok    Deconditioning  - PT/OT     dvt proph - heparin     Code status - Full    MDM:    High Leandro Huerta MD  7/22/2025

## 2025-07-22 NOTE — PLAN OF CARE
Problem: Patient Centered Care  Goal: Patient preferences are identified and integrated in the patient's plan of care  Description: Interventions:  - What would you like us to know as we care for you? From home with    - Provide timely, complete, and accurate information to patient/family  - Incorporate patient and family knowledge, values, beliefs, and cultural backgrounds into the planning and delivery of care  - Encourage patient/family to participate in care and decision-making at the level they choose  - Honor patient and family perspectives and choices  Outcome: Progressing     Problem: Patient/Family Goals  Goal: Patient/Family Long Term Goal  Description: Patient's Long Term Goal: to discharge     Interventions:  - - Monitor vital signs  - Monitor appropriate labs   - Pain management   - Administer medications per order   - Follow MD orders   - Diagnostics per order   - Update/inform patient and family on plan of care   - Discharge planning   - See additional Care Plan goals for specific interventions  Outcome: Progressing  Goal: Patient/Family Short Term Goal  Description: Patient's Short Term Goal: to feel better     Interventions:   - - Monitor vital signs  - Monitor appropriate labs   - Pain management   - Administer medications per order   - Follow MD orders   - Diagnostics per order   - Update/inform patient and family on plan of care   - Discharge planning   - See additional Care Plan goals for specific interventions  Outcome: Progressing     Problem: PAIN - ADULT  Goal: Verbalizes/displays adequate comfort level or patient's stated pain goal  Description: INTERVENTIONS:  - Encourage pt to monitor pain and request assistance  - Assess pain using appropriate pain scale  - Administer analgesics based on type and severity of pain and evaluate response  - Implement non-pharmacological measures as appropriate and evaluate response  - Consider cultural and social influences on pain and pain  management  - Manage/alleviate anxiety  - Utilize distraction and/or relaxation techniques  - Monitor for opioid side effects  - Notify MD/LIP if interventions unsuccessful or patient reports new pain  - Anticipate increased pain with activity and pre-medicate as appropriate  Outcome: Progressing     Problem: RISK FOR INFECTION - ADULT  Goal: Absence of fever/infection during anticipated neutropenic period  Description: INTERVENTIONS  - Monitor WBC  - Administer growth factors as ordered  - Implement neutropenic guidelines  Outcome: Progressing     Problem: SAFETY ADULT - FALL  Goal: Free from fall injury  Description: INTERVENTIONS:  - Assess pt frequently for physical needs  - Identify cognitive and physical deficits and behaviors that affect risk of falls.  - [place] fall precautions as indicated by assessment.  - Educate pt/family on patient safety including physical limitations  - Instruct pt to call for assistance with activity based on assessment  - Modify environment to reduce risk of injury  - Provide assistive devices as appropriate  - Consider OT/PT consult to assist with strengthening/mobility  - Encourage toileting schedule  Outcome: Progressing     Problem: DISCHARGE PLANNING  Goal: Discharge to home or other facility with appropriate resources  Description: INTERVENTIONS:  - Identify barriers to discharge w/pt and caregiver  - Include patient/family/discharge partner in discharge planning  - Arrange for needed discharge resources and transportation as appropriate  - Identify discharge learning needs (meds, wound care, etc)  - Arrange for interpreters to assist at discharge as needed  - Consider post-discharge preferences of patient/family/discharge partner  - Complete POLST form as appropriate  - Assess patient's ability to be responsible for managing their own health  - Refer to Case Management Department for coordinating discharge planning if the patient needs post-hospital services based on  physician/LIP order or complex needs related to functional status, cognitive ability or social support system  Outcome: Progressing     Problem: RESPIRATORY - ADULT  Goal: Achieves optimal ventilation and oxygenation  Description: INTERVENTIONS:  - Assess for changes in respiratory status  - Assess for changes in mentation and behavior  - Position to facilitate oxygenation and minimize respiratory effort  - Oxygen supplementation based on oxygen saturation or ABGs  - Provide Smoking Cessation handout, if applicable  - Encourage broncho-pulmonary hygiene including cough, deep breathe, Incentive Spirometry  - Assess the need for suctioning and perform as needed  - Assess and instruct to report SOB or any respiratory difficulty  - Respiratory Therapy support as indicated  - Manage/alleviate anxiety  - Monitor for signs/symptoms of CO2 retention  Outcome: Progressing     Problem: CARDIOVASCULAR - ADULT  Goal: Maintains optimal cardiac output and hemodynamic stability  Description: INTERVENTIONS:  - Monitor vital signs, rhythm, and trends  - Monitor for bleeding, hypotension and signs of decreased cardiac output  - Evaluate effectiveness of vasoactive medications to optimize hemodynamic stability  - Monitor arterial and/or venous puncture sites for bleeding and/or hematoma  - Assess quality of pulses, skin color and temperature  - Assess for signs of decreased coronary artery perfusion - ex. Angina  - Evaluate fluid balance, assess for edema, trend weights  Outcome: Progressing  Goal: Absence of cardiac arrhythmias or at baseline  Description: INTERVENTIONS:  - Continuous cardiac monitoring, monitor vital signs, obtain 12 lead EKG if indicated  - Evaluate effectiveness of antiarrhythmic and heart rate control medications as ordered  - Initiate emergency measures for life threatening arrhythmias  - Monitor electrolytes and administer replacement therapy as ordered  Outcome: Progressing     Problem: GASTROINTESTINAL -  ADULT  Goal: Maintains adequate nutritional intake (undernourished)  Description: INTERVENTIONS:  - Monitor percentage of each meal consumed  - Identify factors contributing to decreased intake, treat as appropriate  - Assist with meals as needed  - Monitor I&O, WT and lab values  - Obtain nutritional consult as needed  - Optimize oral hygiene and moisture  - Encourage food from home; allow for food preferences  - Enhance eating environment  Outcome: Progressing     Problem: HEMATOLOGIC - ADULT  Goal: Maintains hematologic stability  Description: INTERVENTIONS  - Assess for signs and symptoms of bleeding or hemorrhage  - Monitor labs and vital signs for trends  - Administer supportive blood products/factors, fluids and medications as ordered and appropriate  - Administer supportive blood products/factors as ordered and appropriate  Outcome: Progressing  Goal: Free from bleeding injury  Description: (Example usage: patient with low platelets)  INTERVENTIONS:  - Avoid intramuscular injections, enemas and rectal medication administration  - Ensure safe mobilization of patient  - Hold pressure on venipuncture sites to achieve adequate hemostasis  - Assess for signs and symptoms of internal bleeding  - Monitor lab trends  - Patient is to report abnormal signs of bleeding to staff  - Avoid use of toothpicks and dental floss  - Use electric shaver for shaving  - Use soft bristle tooth brush  - Limit straining and forceful nose blowing  Outcome: Progressing     Problem: MUSCULOSKELETAL - ADULT  Goal: Return mobility to safest level of function  Description: INTERVENTIONS:  - Assess patient stability and activity tolerance for standing, transferring and ambulating w/ or w/o assistive devices  - Assist with transfers and ambulation using safe patient handling equipment as needed  - Ensure adequate protection for wounds/incisions during mobilization  - Obtain PT/OT consults as needed  - Advance activity as appropriate  -  Communicate ordered activity level and limitations with patient/family  Outcome: Progressing

## 2025-07-22 NOTE — PLAN OF CARE
Problem: Patient Centered Care  Goal: Patient preferences are identified and integrated in the patient's plan of care  Description: Interventions:  - What would you like us to know as we care for you? From home with    - Provide timely, complete, and accurate information to patient/family  - Incorporate patient and family knowledge, values, beliefs, and cultural backgrounds into the planning and delivery of care  - Encourage patient/family to participate in care and decision-making at the level they choose  - Honor patient and family perspectives and choices  7/22/2025 0109 by Jeanette Gonzalez RN  Outcome: Progressing  7/22/2025 0107 by Jeanette Gonzalez RN  Outcome: Progressing     Problem: Patient/Family Goals  Goal: Patient/Family Long Term Goal  Description: Patient's Long Term Goal: to discharge     Interventions:  - - Monitor vital signs  - Monitor appropriate labs   - Pain management   - Administer medications per order   - Follow MD orders   - Diagnostics per order   - Update/inform patient and family on plan of care   - Discharge planning   - See additional Care Plan goals for specific interventions  7/22/2025 0109 by Jeanette Gonzalez RN  Outcome: Progressing  7/22/2025 0107 by Jeanette Gonzalez RN  Outcome: Progressing  Goal: Patient/Family Short Term Goal  Description: Patient's Short Term Goal: to feel better     Interventions:   - - Monitor vital signs  - Monitor appropriate labs   - Pain management   - Administer medications per order   - Follow MD orders   - Diagnostics per order   - Update/inform patient and family on plan of care   - Discharge planning   - See additional Care Plan goals for specific interventions  7/22/2025 0109 by Jeanette Gonzalez RN  Outcome: Progressing  7/22/2025 0107 by Jeanette Gonzalez RN  Outcome: Progressing     Problem: PAIN - ADULT  Goal: Verbalizes/displays adequate comfort level or patient's stated pain goal  Description: INTERVENTIONS:  -  Encourage pt to monitor pain and request assistance  - Assess pain using appropriate pain scale  - Administer analgesics based on type and severity of pain and evaluate response  - Implement non-pharmacological measures as appropriate and evaluate response  - Consider cultural and social influences on pain and pain management  - Manage/alleviate anxiety  - Utilize distraction and/or relaxation techniques  - Monitor for opioid side effects  - Notify MD/LIP if interventions unsuccessful or patient reports new pain  - Anticipate increased pain with activity and pre-medicate as appropriate  7/22/2025 0109 by Jeanette Gonzalez RN  Outcome: Progressing  7/22/2025 0107 by Jeanette Gonzalez RN  Outcome: Progressing     Problem: RISK FOR INFECTION - ADULT  Goal: Absence of fever/infection during anticipated neutropenic period  Description: INTERVENTIONS  - Monitor WBC  - Administer growth factors as ordered  - Implement neutropenic guidelines  7/22/2025 0109 by Jeanette Gonzalez RN  Outcome: Progressing  7/22/2025 0107 by Jeanette Gonzalez RN  Outcome: Progressing     Problem: SAFETY ADULT - FALL  Goal: Free from fall injury  Description: INTERVENTIONS:  - Assess pt frequently for physical needs  - Identify cognitive and physical deficits and behaviors that affect risk of falls.  - [place] fall precautions as indicated by assessment.  - Educate pt/family on patient safety including physical limitations  - Instruct pt to call for assistance with activity based on assessment  - Modify environment to reduce risk of injury  - Provide assistive devices as appropriate  - Consider OT/PT consult to assist with strengthening/mobility  - Encourage toileting schedule  7/22/2025 0109 by Jeanette Gonzalez RN  Outcome: Progressing  7/22/2025 0107 by Jeanette Gonzalez RN  Outcome: Progressing     Problem: DISCHARGE PLANNING  Goal: Discharge to home or other facility with appropriate resources  Description: INTERVENTIONS:  -  Identify barriers to discharge w/pt and caregiver  - Include patient/family/discharge partner in discharge planning  - Arrange for needed discharge resources and transportation as appropriate  - Identify discharge learning needs (meds, wound care, etc)  - Arrange for interpreters to assist at discharge as needed  - Consider post-discharge preferences of patient/family/discharge partner  - Complete POLST form as appropriate  - Assess patient's ability to be responsible for managing their own health  - Refer to Case Management Department for coordinating discharge planning if the patient needs post-hospital services based on physician/LIP order or complex needs related to functional status, cognitive ability or social support system  7/22/2025 0109 by Jeanette Gonzalez, RN  Outcome: Progressing  7/22/2025 0107 by Jeanette Gonzalez, RN  Outcome: Progressing     Problem: RESPIRATORY - ADULT  Goal: Achieves optimal ventilation and oxygenation  Description: INTERVENTIONS:  - Assess for changes in respiratory status  - Assess for changes in mentation and behavior  - Position to facilitate oxygenation and minimize respiratory effort  - Oxygen supplementation based on oxygen saturation or ABGs  - Provide Smoking Cessation handout, if applicable  - Encourage broncho-pulmonary hygiene including cough, deep breathe, Incentive Spirometry  - Assess the need for suctioning and perform as needed  - Assess and instruct to report SOB or any respiratory difficulty  - Respiratory Therapy support as indicated  - Manage/alleviate anxiety  - Monitor for signs/symptoms of CO2 retention  7/22/2025 0109 by Jeanette Gonzalez, RN  Outcome: Progressing  7/22/2025 0107 by Jeanette Gonzalez, RN  Outcome: Progressing     Problem: CARDIOVASCULAR - ADULT  Goal: Maintains optimal cardiac output and hemodynamic stability  Description: INTERVENTIONS:  - Monitor vital signs, rhythm, and trends  - Monitor for bleeding, hypotension and signs of  decreased cardiac output  - Evaluate effectiveness of vasoactive medications to optimize hemodynamic stability  - Monitor arterial and/or venous puncture sites for bleeding and/or hematoma  - Assess quality of pulses, skin color and temperature  - Assess for signs of decreased coronary artery perfusion - ex. Angina  - Evaluate fluid balance, assess for edema, trend weights  7/22/2025 0109 by Jeanette Gonzalez RN  Outcome: Progressing  7/22/2025 0107 by Jeanette Gonzalez RN  Outcome: Progressing  Goal: Absence of cardiac arrhythmias or at baseline  Description: INTERVENTIONS:  - Continuous cardiac monitoring, monitor vital signs, obtain 12 lead EKG if indicated  - Evaluate effectiveness of antiarrhythmic and heart rate control medications as ordered  - Initiate emergency measures for life threatening arrhythmias  - Monitor electrolytes and administer replacement therapy as ordered  7/22/2025 0109 by Jeanette Gonzalez RN  Outcome: Progressing  7/22/2025 0107 by Jeantete Gonzalez RN  Outcome: Progressing     Problem: GASTROINTESTINAL - ADULT  Goal: Maintains adequate nutritional intake (undernourished)  Description: INTERVENTIONS:  - Monitor percentage of each meal consumed  - Identify factors contributing to decreased intake, treat as appropriate  - Assist with meals as needed  - Monitor I&O, WT and lab values  - Obtain nutritional consult as needed  - Optimize oral hygiene and moisture  - Encourage food from home; allow for food preferences  - Enhance eating environment  Outcome: Progressing     Problem: HEMATOLOGIC - ADULT  Goal: Maintains hematologic stability  Description: INTERVENTIONS  - Assess for signs and symptoms of bleeding or hemorrhage  - Monitor labs and vital signs for trends  - Administer supportive blood products/factors, fluids and medications as ordered and appropriate  - Administer supportive blood products/factors as ordered and appropriate  Outcome: Progressing  Goal: Free from bleeding  injury  Description: (Example usage: patient with low platelets)  INTERVENTIONS:  - Avoid intramuscular injections, enemas and rectal medication administration  - Ensure safe mobilization of patient  - Hold pressure on venipuncture sites to achieve adequate hemostasis  - Assess for signs and symptoms of internal bleeding  - Monitor lab trends  - Patient is to report abnormal signs of bleeding to staff  - Avoid use of toothpicks and dental floss  - Use electric shaver for shaving  - Use soft bristle tooth brush  - Limit straining and forceful nose blowing  Outcome: Progressing     Problem: MUSCULOSKELETAL - ADULT  Goal: Return mobility to safest level of function  Description: INTERVENTIONS:  - Assess patient stability and activity tolerance for standing, transferring and ambulating w/ or w/o assistive devices  - Assist with transfers and ambulation using safe patient handling equipment as needed  - Ensure adequate protection for wounds/incisions during mobilization  - Obtain PT/OT consults as needed  - Advance activity as appropriate  - Communicate ordered activity level and limitations with patient/family  Outcome: Progressing

## 2025-07-22 NOTE — PROGRESS NOTES
[facility]  [facility]  Cardiology Progress Note    Talita Horan Patient Status:  Inpatient    1950 MRN [id number]   Location [facility]5W Attending Leandro Higgins MD   Hosp Day # 3 PCP Charley Dickerson MD       Subjective: Chest pain overnight but has been coughing and worse with inspiration.  No dyspnea or lower extremity pain/swelling.    Objective:   Temp: 97.9 °F (36.6 °C)  Pulse: 114  Resp: 18  BP: 136/83    Intake/Output:     Intake/Output Summary (Last 24 hours) at 2025 0921  Last data filed at 2025 0901  Gross per 24 hour   Intake 1590 ml   Output 900 ml   Net 690 ml       Last 3 Weights   25 0600 132 lb 11.5 oz (60.2 kg)   25 1836 134 lb 4.2 oz (60.9 kg)   25 1433 122 lb (55.3 kg)   25 0945 128 lb (58.1 kg)   25 1732 130 lb (59 kg)   25 0515 125 lb (56.7 kg)       Tele: ST-SR with no further afib or significant ectopy or pauses overnight    Physical Exam:     General: Alert and oriented x 3. No apparent distress. No respiratory or constitutional distress.  HEENT: Normocephalic, anicteric sclera, neck supple.  Neck: No JVD, carotids 2+, no bruits.  Cardiac: Regular rate and rhythm. S1, S2 normal. No murmur, pericardial rub, S3.  Lungs: Clear without wheezes, rales, rhonchi or dullness.  Normal excursions and effort.  Abdomen: Soft, non-tender.  Extremities: Without clubbing, cyanosis or edema.  Peripheral pulses are 2+.  Neurologic: Alert and oriented, normal affect.  Skin: Warm and dry.     Laboratory/Data:    Labs:         Recent Labs   Lab 25  1440 25  0451 25  0359 25  0227   WBC 24.3* 20.8* 17.0* 14.3*   HGB 14.1 12.9 11.5* 10.6*   MCV 84.0 84.3 86.4 86.0   .0 209.0 215.0 226.0       Recent Labs   Lab 25  1440 25  0451 25  0359 25  1510 25  0227   * 137 138  --  138   K 3.1* 3.3*  3.3* 3.1*  3.1* 3.5 3.6  3.6   CL 96* 107 108   --  108   CO2 21.0 19.0* 22.0  --  23.0   BUN 61* 46* 26*  --  16   CREATSERUM 2.69* 1.73* 1.02  --  0.68   CA 8.9 7.8* 7.8*  --  7.4*   MG 2.1  --  1.8  --  1.7   * 128* 115*  --  107*       Recent Labs   Lab 07/19/25  1440 07/20/25  0451 07/21/25  0359   ALT 33 29 31   AST 21 23 31   ALB 3.3 2.7* 2.6*       Recent Labs   Lab 07/22/25  0227   TROPHS 8       Allergies:   Allergies[1]    Medications:  Current Hospital Medications[2]    Imaging:  Echo 7/20/2025: Conclusions:   1. Left ventricle: The cavity size was normal. Wall thickness was normal.      Systolic function was normal. The estimated ejection fraction was 60-65%,      by biplane method of disks. No diagnostic evidence for regional wall      motion abnormalities. Features are consistent with a pseudonormal left      ventricular filling pattern, with concomitant abnormal relaxation and      increased filling pressure - grade 2 diastolic dysfunction.   2. Left atrium: The left atrial volume was mildly increased.   3. Pulmonary arteries: Systolic pressure was mildly increased, estimated to      be 39mm Hg.     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    Tex Ferrara MD  7/22/2025  9:21 AM           [1]   Allergies  Allergen Reactions    Penicillins RASH    Sulfa Antibiotics ITCHING and RASH   [2]   Current Facility-Administered Medications   Medication Dose Route Frequency    oxyCODONE immediate  release tab 2.5 mg  2.5 mg Oral Q4H PRN    potassium chloride 40 mEq in 250mL sodium chloride 0.9% IVPB premix  40 mEq Intravenous Once    ipratropium-albuterol (Duoneb) 0.5-2.5 (3) MG/3ML inhalation solution 3 mL  3 mL Nebulization Q6H PRN    dextromethorphan-guaiFENesin (Robitussin-DM)  mg/5mL oral solution 10 mL  10 mL Oral Q4H PRN    benzonatate (Tessalon) cap 200 mg  200 mg Oral TID PRN    heparin (Porcine) 5000 UNIT/ML injection 5,000 Units  5,000 Units Subcutaneous 2 times per day    sodium chloride 0.9% infusion   Intravenous Continuous    cefTRIAXone (Rocephin) 1 g in sodium chloride 0.9% 100 mL IVPB-ADDV  1 g Intravenous Q24H    azithromycin (Zithromax) 500 mg in sodium chloride 0.9% 250mL IVPB premix  500 mg Intravenous Q24H    acetaminophen (Tylenol Extra Strength) tab 1,000 mg  1,000 mg Oral Q6H PRN    ondansetron (Zofran) 4 MG/2ML injection 4 mg  4 mg Intravenous Q6H PRN

## 2025-07-23 ENCOUNTER — TELEPHONE (OUTPATIENT)
Dept: FAMILY MEDICINE CLINIC | Facility: CLINIC | Age: 75
End: 2025-07-23

## 2025-07-23 VITALS
HEIGHT: 63 IN | DIASTOLIC BLOOD PRESSURE: 85 MMHG | OXYGEN SATURATION: 95 % | BODY MASS INDEX: 23.51 KG/M2 | TEMPERATURE: 99 F | RESPIRATION RATE: 16 BRPM | HEART RATE: 114 BPM | SYSTOLIC BLOOD PRESSURE: 127 MMHG | WEIGHT: 132.69 LBS

## 2025-07-23 LAB
ANION GAP SERPL CALC-SCNC: 8 MMOL/L (ref 0–18)
BASOPHILS # BLD: 0 X10(3) UL (ref 0–0.2)
BASOPHILS NFR BLD: 0 %
BUN BLD-MCNC: 11 MG/DL (ref 9–23)
BUN/CREAT SERPL: 19.6 (ref 10–20)
CALCIUM BLD-MCNC: 7.6 MG/DL (ref 8.7–10.4)
CHLORIDE SERPL-SCNC: 106 MMOL/L (ref 98–112)
CO2 SERPL-SCNC: 26 MMOL/L (ref 21–32)
CREAT BLD-MCNC: 0.56 MG/DL (ref 0.55–1.02)
DEPRECATED RDW RBC AUTO: 44.1 FL (ref 35.1–46.3)
EGFRCR SERPLBLD CKD-EPI 2021: 96 ML/MIN/1.73M2 (ref 60–?)
EOSINOPHIL # BLD: 0.82 X10(3) UL (ref 0–0.7)
EOSINOPHIL NFR BLD: 6 %
ERYTHROCYTE [DISTWIDTH] IN BLOOD BY AUTOMATED COUNT: 13.6 % (ref 11–15)
GLUCOSE BLD-MCNC: 96 MG/DL (ref 70–99)
HCT VFR BLD AUTO: 33.4 % (ref 35–48)
HGB BLD-MCNC: 11.5 G/DL (ref 12–16)
LYMPHOCYTES NFR BLD: 16 %
LYMPHOCYTES NFR BLD: 2.19 X10(3) UL (ref 1–4)
M PNEUMO IGG ABS: 531 U/ML
M PNEUMO IGM ABS: <770 U/ML
MAGNESIUM SERPL-MCNC: 1.6 MG/DL (ref 1.6–2.6)
MCH RBC QN AUTO: 30.3 PG (ref 26–34)
MCHC RBC AUTO-ENTMCNC: 34.4 G/DL (ref 31–37)
MCV RBC AUTO: 88.1 FL (ref 80–100)
MONOCYTES # BLD: 0.41 X10(3) UL (ref 0.1–1)
MONOCYTES NFR BLD: 3 %
MORPHOLOGY: NORMAL
NEUTROPHILS # BLD AUTO: 9.34 X10 (3) UL (ref 1.5–7.7)
NEUTROPHILS NFR BLD: 72 %
NEUTS BAND NFR BLD: 3 %
NEUTS HYPERSEG # BLD: 10.28 X10(3) UL (ref 1.5–7.7)
OSMOLALITY SERPL CALC.SUM OF ELEC: 289 MOSM/KG (ref 275–295)
PLATELET # BLD AUTO: 316 10(3)UL (ref 150–450)
PLATELET MORPHOLOGY: NORMAL
POTASSIUM SERPL-SCNC: 3.4 MMOL/L (ref 3.5–5.1)
POTASSIUM SERPL-SCNC: 3.4 MMOL/L (ref 3.5–5.1)
RBC # BLD AUTO: 3.79 X10(6)UL (ref 3.8–5.3)
SODIUM SERPL-SCNC: 140 MMOL/L (ref 136–145)
TOTAL CELLS COUNTED BLD: 100
WBC # BLD AUTO: 13.7 X10(3) UL (ref 4–11)

## 2025-07-23 PROCEDURE — 99239 HOSP IP/OBS DSCHRG MGMT >30: CPT | Performed by: STUDENT IN AN ORGANIZED HEALTH CARE EDUCATION/TRAINING PROGRAM

## 2025-07-23 PROCEDURE — 99232 SBSQ HOSP IP/OBS MODERATE 35: CPT | Performed by: INTERNAL MEDICINE

## 2025-07-23 RX ORDER — MAGNESIUM OXIDE 400 MG/1
400 TABLET ORAL ONCE
Status: COMPLETED | OUTPATIENT
Start: 2025-07-23 | End: 2025-07-23

## 2025-07-23 RX ORDER — POTASSIUM CHLORIDE 1.5 G/1.58G
40 POWDER, FOR SOLUTION ORAL EVERY 4 HOURS
Status: COMPLETED | OUTPATIENT
Start: 2025-07-23 | End: 2025-07-23

## 2025-07-23 RX ORDER — BENZONATATE 200 MG/1
200 CAPSULE ORAL 3 TIMES DAILY PRN
Qty: 15 CAPSULE | Refills: 0 | Status: SHIPPED | OUTPATIENT
Start: 2025-07-23 | End: 2025-07-28

## 2025-07-23 RX ORDER — MAGNESIUM OXIDE 400 MG/1
400 TABLET ORAL ONCE
Status: DISCONTINUED | OUTPATIENT
Start: 2025-07-23 | End: 2025-07-23

## 2025-07-23 RX ORDER — CEFDINIR 300 MG/1
300 CAPSULE ORAL 2 TIMES DAILY
Qty: 4 CAPSULE | Refills: 0 | Status: SHIPPED | OUTPATIENT
Start: 2025-07-23 | End: 2025-07-25

## 2025-07-23 NOTE — PROGRESS NOTES
[facility]  part of [facility]    Progress Note    Talita Horan Patient Status:  Inpatient    1950 MRN [id number]   Location [facility]5W Attending Lena Rayo MD   Hosp Day # 4 PCP Charley Dickerson MD       Subjective:   Subjective:  Much better today  Up to the chair and comfortable on room air  No fever or chills  Less cough  Better appetite  No dyspnea  No further diarrhea  Objective:   Blood pressure 127/85, pulse 114, temperature 98.5 °F (36.9 °C), temperature source Oral, resp. rate 16, height 5' 3\" (1.6 m), weight 132 lb 11.5 oz (60.2 kg), SpO2 95%.  Physical Exam  Vitals and nursing note reviewed.   Constitutional:       General: She is not in acute distress.  HENT:      Head: Atraumatic.   Eyes:      General: No scleral icterus.  Cardiovascular:      Rate and Rhythm: Normal rate.      Heart sounds:      No gallop.   Pulmonary:      Comments: Good air exchange to both lungs  Minimal basilar rales otherwise clear  Abdominal:      General: Abdomen is flat. Bowel sounds are normal.      Palpations: Abdomen is soft.   Musculoskeletal:      Cervical back: Normal range of motion.      Right lower leg: No edema.      Left lower leg: No edema.   Skin:     General: Skin is dry.   Neurological:      General: No focal deficit present.      Mental Status: She is oriented to person, place, and time.         Results:   Lab Results   Component Value Date    WBC 13.7 (H) 2025    HGB 11.5 (L) 2025    HCT 33.4 (L) 2025    .0 2025    CREATSERUM 0.56 2025    BUN 11 2025     2025    K 3.4 (L) 2025    K 3.4 (L) 2025     2025    CO2 26.0 2025    GLU 96 2025    CA 7.6 (L) 2025    ALB 2.6 (L) 2025    ALKPHO 207 (H) 2025    BILT 2.6 (H) 2025    TP 4.7 (L) 2025    AST 31 2025    ALT 31 2025    TSH 0.680 2025    DDIMER 2.32 (H) 2025     ESRML 62 (H) 07/22/2025    MG 1.6 07/23/2025    TROPHS 8 07/22/2025       CT CHEST PE AORTA (IV ONLY) (CPT=71260)  Result Date: 7/22/2025  CONCLUSION: 1.  No evidence of acute pulmonary embolism to the level of the proximal segmental pulmonary artery branches. 2.  Small to moderate bilateral pleural effusions and associated basilar atelectasis, with or without superimposed pneumonia. 3.  Mild pulmonary interstitial edema is suspected. 4.  Diffuse bronchial wall thickening, perhaps some degree of underlying bronchitis. 5.  Borderline mediastinal/perihilar lymph nodes may be reactive. 6.  Subcentimeter thyroid nodules. 7.  Lesser incidental findings as above. Electronically Verified and Signed by Attending Radiologist: Gregorio Santo MD 7/22/2025 1:56 PM Workstation: [employer]    EKG 12 Lead  Result Date: 7/22/2025  Sinus tachycardia Otherwise normal ECG When compared with ECG of 19-JUL-2025 15:45, Sinus rhythm has replaced Atrial fibrillation ST no longer depressed in Anterior leads Confirmed by CHANG JIMENEZ, GHOSH ([id number]) on 7/22/2025 8:24:11 AM      Assessment & Plan:       1- s/p shock state  /Hypovolemic and septic   Resolved      -acute gastroenteritis with recent travel to [place] / back on 7/16  - acute pneumonia  / atypical         leukocytosis / down trending today   High procalcitonin /downtrending  BC negative   Mycoplasma serology pending   Negative respiratory panel   Negative legionella and pneumococcal antigen   Chest CT with no PE     IV Rocephin and azithromycin        2-acute kidney injury  Resolved      3-new onset intermittent A-fib with RVR   now  NSR   Normal echo     4-DVT prophylaxis with heparin subcu        D/w    D/w staff   Much better overall and okay to discharge on p.o. Augmentin for 2 days and then stop  Follow-up with me in 1 to 2 weeks              Lauren Barrera MD  7/23/2025

## 2025-07-23 NOTE — PLAN OF CARE
Patient discharged to home. IV removed, discharge education provided, patient sent with all personal belongings, and discharge instructions. Addressed additional questions.    Problem: Patient Centered Care  Goal: Patient preferences are identified and integrated in the patient's plan of care  Description: Interventions:  - What would you like us to know as we care for you? From home with    - Provide timely, complete, and accurate information to patient/family  - Incorporate patient and family knowledge, values, beliefs, and cultural backgrounds into the planning and delivery of care  - Encourage patient/family to participate in care and decision-making at the level they choose  - Honor patient and family perspectives and choices  Outcome: Adequate for Discharge     Problem: Patient/Family Goals  Goal: Patient/Family Long Term Goal  Description: Patient's Long Term Goal: to discharge     Interventions:  - - Monitor vital signs  - Monitor appropriate labs   - Pain management   - Administer medications per order   - Follow MD orders   - Diagnostics per order   - Update/inform patient and family on plan of care   - Discharge planning   - See additional Care Plan goals for specific interventions  Outcome: Adequate for Discharge  Goal: Patient/Family Short Term Goal  Description: Patient's Short Term Goal: to feel better     Interventions:   - - Monitor vital signs  - Monitor appropriate labs   - Pain management   - Administer medications per order   - Follow MD orders   - Diagnostics per order   - Update/inform patient and family on plan of care   - Discharge planning   - See additional Care Plan goals for specific interventions  Outcome: Adequate for Discharge     Problem: PAIN - ADULT  Goal: Verbalizes/displays adequate comfort level or patient's stated pain goal  Description: INTERVENTIONS:  - Encourage pt to monitor pain and request assistance  - Assess pain using appropriate pain scale  - Administer  analgesics based on type and severity of pain and evaluate response  - Implement non-pharmacological measures as appropriate and evaluate response  - Consider cultural and social influences on pain and pain management  - Manage/alleviate anxiety  - Utilize distraction and/or relaxation techniques  - Monitor for opioid side effects  - Notify MD/LIP if interventions unsuccessful or patient reports new pain  - Anticipate increased pain with activity and pre-medicate as appropriate  Outcome: Adequate for Discharge     Problem: RISK FOR INFECTION - ADULT  Goal: Absence of fever/infection during anticipated neutropenic period  Description: INTERVENTIONS  - Monitor WBC  - Administer growth factors as ordered  - Implement neutropenic guidelines  Outcome: Adequate for Discharge     Problem: SAFETY ADULT - FALL  Goal: Free from fall injury  Description: INTERVENTIONS:  - Assess pt frequently for physical needs  - Identify cognitive and physical deficits and behaviors that affect risk of falls.  - [place] fall precautions as indicated by assessment.  - Educate pt/family on patient safety including physical limitations  - Instruct pt to call for assistance with activity based on assessment  - Modify environment to reduce risk of injury  - Provide assistive devices as appropriate  - Consider OT/PT consult to assist with strengthening/mobility  - Encourage toileting schedule  Outcome: Adequate for Discharge     Problem: DISCHARGE PLANNING  Goal: Discharge to home or other facility with appropriate resources  Description: INTERVENTIONS:  - Identify barriers to discharge w/pt and caregiver  - Include patient/family/discharge partner in discharge planning  - Arrange for needed discharge resources and transportation as appropriate  - Identify discharge learning needs (meds, wound care, etc)  - Arrange for interpreters to assist at discharge as needed  - Consider post-discharge preferences of patient/family/discharge partner  -  Complete POLST form as appropriate  - Assess patient's ability to be responsible for managing their own health  - Refer to Case Management Department for coordinating discharge planning if the patient needs post-hospital services based on physician/LIP order or complex needs related to functional status, cognitive ability or social support system  Outcome: Adequate for Discharge     Problem: RESPIRATORY - ADULT  Goal: Achieves optimal ventilation and oxygenation  Description: INTERVENTIONS:  - Assess for changes in respiratory status  - Assess for changes in mentation and behavior  - Position to facilitate oxygenation and minimize respiratory effort  - Oxygen supplementation based on oxygen saturation or ABGs  - Provide Smoking Cessation handout, if applicable  - Encourage broncho-pulmonary hygiene including cough, deep breathe, Incentive Spirometry  - Assess the need for suctioning and perform as needed  - Assess and instruct to report SOB or any respiratory difficulty  - Respiratory Therapy support as indicated  - Manage/alleviate anxiety  - Monitor for signs/symptoms of CO2 retention  Outcome: Adequate for Discharge     Problem: CARDIOVASCULAR - ADULT  Goal: Maintains optimal cardiac output and hemodynamic stability  Description: INTERVENTIONS:  - Monitor vital signs, rhythm, and trends  - Monitor for bleeding, hypotension and signs of decreased cardiac output  - Evaluate effectiveness of vasoactive medications to optimize hemodynamic stability  - Monitor arterial and/or venous puncture sites for bleeding and/or hematoma  - Assess quality of pulses, skin color and temperature  - Assess for signs of decreased coronary artery perfusion - ex. Angina  - Evaluate fluid balance, assess for edema, trend weights  Outcome: Adequate for Discharge  Goal: Absence of cardiac arrhythmias or at baseline  Description: INTERVENTIONS:  - Continuous cardiac monitoring, monitor vital signs, obtain 12 lead EKG if indicated  -  Evaluate effectiveness of antiarrhythmic and heart rate control medications as ordered  - Initiate emergency measures for life threatening arrhythmias  - Monitor electrolytes and administer replacement therapy as ordered  Outcome: Adequate for Discharge     Problem: GASTROINTESTINAL - ADULT  Goal: Maintains adequate nutritional intake (undernourished)  Description: INTERVENTIONS:  - Monitor percentage of each meal consumed  - Identify factors contributing to decreased intake, treat as appropriate  - Assist with meals as needed  - Monitor I&O, WT and lab values  - Obtain nutritional consult as needed  - Optimize oral hygiene and moisture  - Encourage food from home; allow for food preferences  - Enhance eating environment  Outcome: Adequate for Discharge     Problem: HEMATOLOGIC - ADULT  Goal: Maintains hematologic stability  Description: INTERVENTIONS  - Assess for signs and symptoms of bleeding or hemorrhage  - Monitor labs and vital signs for trends  - Administer supportive blood products/factors, fluids and medications as ordered and appropriate  - Administer supportive blood products/factors as ordered and appropriate  Outcome: Adequate for Discharge  Goal: Free from bleeding injury  Description: (Example usage: patient with low platelets)  INTERVENTIONS:  - Avoid intramuscular injections, enemas and rectal medication administration  - Ensure safe mobilization of patient  - Hold pressure on venipuncture sites to achieve adequate hemostasis  - Assess for signs and symptoms of internal bleeding  - Monitor lab trends  - Patient is to report abnormal signs of bleeding to staff  - Avoid use of toothpicks and dental floss  - Use electric shaver for shaving  - Use soft bristle tooth brush  - Limit straining and forceful nose blowing  Outcome: Adequate for Discharge     Problem: MUSCULOSKELETAL - ADULT  Goal: Return mobility to safest level of function  Description: INTERVENTIONS:  - Assess patient stability and  activity tolerance for standing, transferring and ambulating w/ or w/o assistive devices  - Assist with transfers and ambulation using safe patient handling equipment as needed  - Ensure adequate protection for wounds/incisions during mobilization  - Obtain PT/OT consults as needed  - Advance activity as appropriate  - Communicate ordered activity level and limitations with patient/family  Outcome: Adequate for Discharge

## 2025-07-23 NOTE — CM/SW NOTE
07/23/25 1100   Discharge disposition   Expected discharge disposition Home or Self   Post Acute Care Provider   ([facility])   Discharge transportation Private car     MDO for discharge ordered.    CM met with patient and  to discuss Home Health Choice: Choice is [facility].    CM reserved [facility] in [place] and notified liaison they are choice.    Plan: Discharge Home with [facility].    Patient cleared for discharge from /social work standpoint.    MARICARMEN BreenN, RN  Nurse   [phone]

## 2025-07-23 NOTE — PROGRESS NOTES
07/23/25 1135   Mobility   Activity Ambulate in doss   Level of Assistance Standby assist, set-up cues, supervision of patient - no hands on   Assistive Device Front wheel walker   Ambulation Response Tolerated well   O2 walk? Yes   SPO2% on Room Air at Rest 96   SPO2% Ambulation on Room Air 95

## 2025-07-23 NOTE — TELEPHONE ENCOUNTER
Kita from [facility] called to check if  will sign and follow patient for home health services.

## 2025-07-23 NOTE — PLAN OF CARE
Problem: Patient Centered Care  Goal: Patient preferences are identified and integrated in the patient's plan of care  Description: Interventions:  - What would you like us to know as we care for you? From home with    - Provide timely, complete, and accurate information to patient/family  - Incorporate patient and family knowledge, values, beliefs, and cultural backgrounds into the planning and delivery of care  - Encourage patient/family to participate in care and decision-making at the level they choose  - Honor patient and family perspectives and choices  Outcome: Progressing     Problem: Patient/Family Goals  Goal: Patient/Family Long Term Goal  Description: Patient's Long Term Goal: to discharge     Interventions:  - - Monitor vital signs  - Monitor appropriate labs   - Pain management   - Administer medications per order   - Follow MD orders   - Diagnostics per order   - Update/inform patient and family on plan of care   - Discharge planning   - See additional Care Plan goals for specific interventions  Outcome: Progressing  Goal: Patient/Family Short Term Goal  Description: Patient's Short Term Goal: to feel better     Interventions:   - - Monitor vital signs  - Monitor appropriate labs   - Pain management   - Administer medications per order   - Follow MD orders   - Diagnostics per order   - Update/inform patient and family on plan of care   - Discharge planning   - See additional Care Plan goals for specific interventions  Outcome: Progressing     Problem: PAIN - ADULT  Goal: Verbalizes/displays adequate comfort level or patient's stated pain goal  Description: INTERVENTIONS:  - Encourage pt to monitor pain and request assistance  - Assess pain using appropriate pain scale  - Administer analgesics based on type and severity of pain and evaluate response  - Implement non-pharmacological measures as appropriate and evaluate response  - Consider cultural and social influences on pain and pain  management  - Manage/alleviate anxiety  - Utilize distraction and/or relaxation techniques  - Monitor for opioid side effects  - Notify MD/LIP if interventions unsuccessful or patient reports new pain  - Anticipate increased pain with activity and pre-medicate as appropriate  Outcome: Progressing     Problem: RISK FOR INFECTION - ADULT  Goal: Absence of fever/infection during anticipated neutropenic period  Description: INTERVENTIONS  - Monitor WBC  - Administer growth factors as ordered  - Implement neutropenic guidelines  Outcome: Progressing     Problem: SAFETY ADULT - FALL  Goal: Free from fall injury  Description: INTERVENTIONS:  - Assess pt frequently for physical needs  - Identify cognitive and physical deficits and behaviors that affect risk of falls.  - [place] fall precautions as indicated by assessment.  - Educate pt/family on patient safety including physical limitations  - Instruct pt to call for assistance with activity based on assessment  - Modify environment to reduce risk of injury  - Provide assistive devices as appropriate  - Consider OT/PT consult to assist with strengthening/mobility  - Encourage toileting schedule  Outcome: Progressing     Problem: DISCHARGE PLANNING  Goal: Discharge to home or other facility with appropriate resources  Description: INTERVENTIONS:  - Identify barriers to discharge w/pt and caregiver  - Include patient/family/discharge partner in discharge planning  - Arrange for needed discharge resources and transportation as appropriate  - Identify discharge learning needs (meds, wound care, etc)  - Arrange for interpreters to assist at discharge as needed  - Consider post-discharge preferences of patient/family/discharge partner  - Complete POLST form as appropriate  - Assess patient's ability to be responsible for managing their own health  - Refer to Case Management Department for coordinating discharge planning if the patient needs post-hospital services based on  physician/LIP order or complex needs related to functional status, cognitive ability or social support system  Outcome: Progressing     Problem: RESPIRATORY - ADULT  Goal: Achieves optimal ventilation and oxygenation  Description: INTERVENTIONS:  - Assess for changes in respiratory status  - Assess for changes in mentation and behavior  - Position to facilitate oxygenation and minimize respiratory effort  - Oxygen supplementation based on oxygen saturation or ABGs  - Provide Smoking Cessation handout, if applicable  - Encourage broncho-pulmonary hygiene including cough, deep breathe, Incentive Spirometry  - Assess the need for suctioning and perform as needed  - Assess and instruct to report SOB or any respiratory difficulty  - Respiratory Therapy support as indicated  - Manage/alleviate anxiety  - Monitor for signs/symptoms of CO2 retention  Outcome: Progressing     Problem: CARDIOVASCULAR - ADULT  Goal: Maintains optimal cardiac output and hemodynamic stability  Description: INTERVENTIONS:  - Monitor vital signs, rhythm, and trends  - Monitor for bleeding, hypotension and signs of decreased cardiac output  - Evaluate effectiveness of vasoactive medications to optimize hemodynamic stability  - Monitor arterial and/or venous puncture sites for bleeding and/or hematoma  - Assess quality of pulses, skin color and temperature  - Assess for signs of decreased coronary artery perfusion - ex. Angina  - Evaluate fluid balance, assess for edema, trend weights  Outcome: Progressing  Goal: Absence of cardiac arrhythmias or at baseline  Description: INTERVENTIONS:  - Continuous cardiac monitoring, monitor vital signs, obtain 12 lead EKG if indicated  - Evaluate effectiveness of antiarrhythmic and heart rate control medications as ordered  - Initiate emergency measures for life threatening arrhythmias  - Monitor electrolytes and administer replacement therapy as ordered  Outcome: Progressing     Problem: GASTROINTESTINAL -  ADULT  Goal: Maintains adequate nutritional intake (undernourished)  Description: INTERVENTIONS:  - Monitor percentage of each meal consumed  - Identify factors contributing to decreased intake, treat as appropriate  - Assist with meals as needed  - Monitor I&O, WT and lab values  - Obtain nutritional consult as needed  - Optimize oral hygiene and moisture  - Encourage food from home; allow for food preferences  - Enhance eating environment  Outcome: Progressing     Problem: HEMATOLOGIC - ADULT  Goal: Maintains hematologic stability  Description: INTERVENTIONS  - Assess for signs and symptoms of bleeding or hemorrhage  - Monitor labs and vital signs for trends  - Administer supportive blood products/factors, fluids and medications as ordered and appropriate  - Administer supportive blood products/factors as ordered and appropriate  Outcome: Progressing  Goal: Free from bleeding injury  Description: (Example usage: patient with low platelets)  INTERVENTIONS:  - Avoid intramuscular injections, enemas and rectal medication administration  - Ensure safe mobilization of patient  - Hold pressure on venipuncture sites to achieve adequate hemostasis  - Assess for signs and symptoms of internal bleeding  - Monitor lab trends  - Patient is to report abnormal signs of bleeding to staff  - Avoid use of toothpicks and dental floss  - Use electric shaver for shaving  - Use soft bristle tooth brush  - Limit straining and forceful nose blowing  Outcome: Progressing     Problem: MUSCULOSKELETAL - ADULT  Goal: Return mobility to safest level of function  Description: INTERVENTIONS:  - Assess patient stability and activity tolerance for standing, transferring and ambulating w/ or w/o assistive devices  - Assist with transfers and ambulation using safe patient handling equipment as needed  - Ensure adequate protection for wounds/incisions during mobilization  - Obtain PT/OT consults as needed  - Advance activity as appropriate  -  Communicate ordered activity level and limitations with patient/family  Outcome: Progressing

## 2025-07-23 NOTE — DISCHARGE INSTRUCTIONS
Sometimes managing your health at home requires assistance.  The [facility] team has recognized your preference to use [facility] ([phone]).  A representative from the home health agency will contact you or your family to schedule your first visit.

## 2025-07-23 NOTE — DISCHARGE SUMMARY
[facility]  part of [facility]    Discharge Summary    Talita Horan Patient Status:  Inpatient    1950 MRN [id number]   Location [facility]5W Attending Lena Rayo MD   Hosp Day # 4 PCP Charley Dickerson MD     Date of Admission: 2025   Date of Discharge: 25     Admitting Diagnosis: Dehydration [E86.0]  SON (acute kidney injury) [N17.9]  Acute cystitis with hematuria [N30.01]  Nausea vomiting and diarrhea [R11.2, R19.7]  Hypotension due to hypovolemia [E86.1]    Disposition: Home    Discharge Diagnosis: .Principal Problem:    SON (acute kidney injury)  Active Problems:    Dehydration    Nausea vomiting and diarrhea    Acute cystitis with hematuria    Hypotension due to hypovolemia    Hypovolemic shock (HCC)      Hospital Course:   Reason for Admission: Hypotensive shock, SON, UTI, new Afib      Discharge Physical Exam:   General: Elderly female, No acute distress. Alert   Respiratory: Clear to auscultation bilaterally. No wheezes. No rhonchi.  Cardiovascular: S1, S2. Regular rate and rhythm. No murmurs, rubs or gallops.   Abdomen: Soft, nontender, nondistended.  Positive bowel sounds. No rebound or guarding.  Neurologic: No focal neurological deficits.   Musculoskeletal: Moves all extremities.  Extremities: No edema.        Hospital Course:    Talita Horan is a(n) 75 year old female with a history of HL who was just in  and arrived home and then presented to ER with malaise and poor appetite. Along with nonbloody diarrhea and n/v for several days while traveling. In the ER, SBP in 60s initially so admitted to ICU for hypovolemic shock. She was found to have UTI and likely pneumonia, and started on IV Abx. Admission labs also notable for SON. She was treated for acute gastroenteritis with IVFs, and her renal function and GI symptoms resolved. Her leukocytosis resolved, and she was weaned off pressors and thereafter remained hemodynamically stable.  She had some pleuritic chest pain with elevated ddimer, but CT PE negative for PE.   She was also noted to have new Afib (likely 2/2 to above stress), but no further tele events, and reassuring 2D echocardiogram. Therefore, no further inpatient cardiac imaging/testing required. Patient advised on outpatient 7 day MCT and follow-up with [facility] office for establishment of outpatient follow-up.   She was discharged home with 2 more days of antibiotics to compete 7 day course. Cefdinir prescribed since PCN allergy.   Follow up appointments provided. ER precautions reviewed. Patient discharged home with .   Discussed with ,patient at bedside.       Complications: None    Consultants         Provider   Role Specialty     Alexandr Odell MD      Consulting Physician CARDIOLOGY     Lauren Barrera MD      Consulting Physician PULMONARY DISEASES            Pending Labs       Order Current Status    Blood Culture Preliminary result    Blood Culture Preliminary result            Discharge Plan:   Discharge Condition: Stable    Current Discharge Medication List        New Orders    Details   benzonatate 200 MG Oral Cap Take 1 capsule (200 mg total) by mouth 3 (three) times daily as needed for cough.      cefdinir 300 MG Oral Cap Take 1 capsule (300 mg total) by mouth 2 (two) times daily for 2 days.           Home Meds - Unchanged    Details   rosuvastatin 10 MG Oral Tab Take 1 tablet (10 mg total) by mouth nightly.      alendronate 70 MG Oral Tab Take 1 tablet (70 mg total) by mouth once a week.      Multiple Vitamins-Minerals (WOMENS 50+ MULTI VITAMIN/MIN) Oral Tab Take 1 tablet by mouth daily.                 Discharge Diet: General diet    Discharge Activity: As tolerated    Follow up:      Follow-up Information       Chato Preston MD Follow up in 2 week(s).    Specialties: Interventional, Cardiology, CARDIOLOGY  Why: Office will call to schedule follow up for MCT placement and MD office visit after  completion of MCT  Contact information:  [address]  [facility] [address]  [phone]               Charley Dickerson MD. Go in 2 day(s).    Specialty: Family Medicine  Contact information:  [address] [facility] [id number] [phone]               Lauren Barrera MD. Go in 1 week(s).    Specialties: PULMONARY DISEASES, Internal Medicine, Critical Care  Why: Please follow up with pulmonary, but if feeling better then okay to cancel.  Contact information:  [address]  [address]  [facility] [address] [phone]                                   Other Discharge Instructions:         Sometimes managing your health at home requires assistance.  The [facility] team has recognized your preference to use [facility] ([phone]).  A representative from the home health agency will contact you or your family to schedule your first visit.             >30 minutes spent on discharge orders, coordination, and planning.     Lena Rayo MD  7/23/2025

## 2025-07-24 ENCOUNTER — PATIENT OUTREACH (OUTPATIENT)
Dept: CASE MANAGEMENT | Age: 75
End: 2025-07-24

## 2025-07-24 ENCOUNTER — TELEPHONE (OUTPATIENT)
Dept: PULMONOLOGY | Facility: CLINIC | Age: 75
End: 2025-07-24

## 2025-07-24 NOTE — PROGRESS NOTES
Hospital Follow up for PCP (Discharge 7/23 [facility])       PCP   Charley Dickerson  [address] [id number] [phone]  Appt made for 7/31@11:45am     Confirmed with pt's    Closing encounter    Never

## 2025-07-24 NOTE — TELEPHONE ENCOUNTER
Per spouse patient was seen by Dr. Barrera at [facility], was advised to make 1 week follow up appointment, no openings available. Please advise thank you.

## 2025-07-24 NOTE — TELEPHONE ENCOUNTER
Spoke with patient's  Saleem. Appointment scheduled on 7/30/25 at 4PM. Confirmed date, time, place, and parking.

## 2025-07-24 NOTE — PAYOR COMM NOTE
--------------  DISCHARGE REVIEW    Payor: [employer]  Subscriber #:  [id number]  Authorization Number: [id number]    Admit date: 25  Admit time:   6:11 PM  Discharge Date: 2025  1:26 PM     Admitting Physician: Leandro Higgins MD  Attending Physician:  No att. providers found  Primary Care Physician: Charley Dickerson MD          Discharge Summary Notes        Discharge Summary signed by Lena Rayo MD at 2025  9:45 PM       Author: Lena Rayo MD Specialty: HOSPITALIST, Family Medicine Author Type: Physician    Filed: 2025  9:45 PM Date of Service: 2025 10:13 AM Status: Signed    : Lena Rayo MD (Physician)         [facility]  part of [facility]    Discharge Summary    Talita Horan Patient Status:  Inpatient    1950 MRN [id number]   Location [facility]5W Attending Lena Rayo MD   Hosp Day # 4 PCP Charley Dickerson MD     Date of Admission: 2025   Date of Discharge: 25     Admitting Diagnosis: Dehydration [E86.0]  SON (acute kidney injury) [N17.9]  Acute cystitis with hematuria [N30.01]  Nausea vomiting and diarrhea [R11.2, R19.7]  Hypotension due to hypovolemia [E86.1]    Disposition: Home    Discharge Diagnosis: .Principal Problem:    SON (acute kidney injury)  Active Problems:    Dehydration    Nausea vomiting and diarrhea    Acute cystitis with hematuria    Hypotension due to hypovolemia    Hypovolemic shock ([facility])      Hospital Course:   Reason for Admission: Hypotensive shock, SON, UTI, new Afib      Discharge Physical Exam:   General: Elderly female, No acute distress. Alert   Respiratory: Clear to auscultation bilaterally. No wheezes. No rhonchi.  Cardiovascular: S1, S2. Regular rate and rhythm. No murmurs, rubs or gallops.   Abdomen: Soft, nontender, nondistended.  Positive bowel sounds. No rebound or guarding.  Neurologic: No focal neurological deficits.   Musculoskeletal: Moves all  extremities.  Extremities: No edema.        Hospital Course:    Talita Horan is a(n) 75 year old female with a history of HL who was just in  and arrived home and then presented to ER with malaise and poor appetite. Along with nonbloody diarrhea and n/v for several days while traveling. In the ER, SBP in 60s initially so admitted to ICU for hypovolemic shock. She was found to have UTI and likely pneumonia, and started on IV Abx. Admission labs also notable for SON. She was treated for acute gastroenteritis with IVFs, and her renal function and GI symptoms resolved. Her leukocytosis resolved, and she was weaned off pressors and thereafter remained hemodynamically stable. She had some pleuritic chest pain with elevated ddimer, but CT PE negative for PE.   She was also noted to have new Afib (likely 2/2 to above stress), but no further tele events, and reassuring 2D echocardiogram. Therefore, no further inpatient cardiac imaging/testing required. Patient advised on outpatient 7 day MCT and follow-up with [facility] office for establishment of outpatient follow-up.   She was discharged home with 2 more days of antibiotics to compete 7 day course. Cefdinir prescribed since PCN allergy.   Follow up appointments provided. ER precautions reviewed. Patient discharged home with .   Discussed with ,patient at bedside.       Complications: None    Consultants         Provider   Role Specialty     Alexandr Odell MD      Consulting Physician CARDIOLOGY     Lauren Barrera MD      Consulting Physician PULMONARY DISEASES            Pending Labs       Order Current Status    Blood Culture Preliminary result    Blood Culture Preliminary result            Discharge Plan:   Discharge Condition: Stable    Current Discharge Medication List        New Orders    Details   benzonatate 200 MG Oral Cap Take 1 capsule (200 mg total) by mouth 3 (three) times daily as needed for cough.      cefdinir 300 MG Oral Cap Take  1 capsule (300 mg total) by mouth 2 (two) times daily for 2 days.           Home Meds - Unchanged    Details   rosuvastatin 10 MG Oral Tab Take 1 tablet (10 mg total) by mouth nightly.      alendronate 70 MG Oral Tab Take 1 tablet (70 mg total) by mouth once a week.      Multiple Vitamins-Minerals (WOMENS 50+ MULTI VITAMIN/MIN) Oral Tab Take 1 tablet by mouth daily.                 Discharge Diet: General diet    Discharge Activity: As tolerated    Follow up:      Follow-up Information       Chato Preston MD Follow up in 2 week(s).    Specialties: Interventional, Cardiology, CARDIOLOGY  Why: Office will call to schedule follow up for MCT placement and MD office visit after completion of MCT  Contact information:  [address]  [address]  [facility] [id number] [phone]               Charley Dickerson MD. Go in 2 day(s).    Specialty: Family Medicine  Contact information:  [address] [facility] [address]  [phone]               Lauren Barrera MD. Go in 1 week(s).    Specialties: PULMONARY DISEASES, Internal Medicine, Critical Care  Why: Please follow up with pulmonary, but if feeling better then okay to cancel.  Contact information:  [address]  [address]  [facility] [address] [phone]                                   Other Discharge Instructions:         Sometimes managing your health at home requires assistance.  The [facility] team has recognized your preference to use [facility] ([phone]).  A representative from the home health agency will contact you or your family to schedule your first visit.             >30 minutes spent on discharge orders, coordination, and planning.     Lena Rayo MD  7/23/2025    Electronically signed by Lena Rayo MD on 7/23/2025  9:45 PM         REVIEWER COMMENTS

## 2025-07-26 ENCOUNTER — OFFICE VISIT (OUTPATIENT)
Dept: FAMILY MEDICINE CLINIC | Facility: CLINIC | Age: 75
End: 2025-07-26

## 2025-07-26 VITALS
BODY MASS INDEX: 21.71 KG/M2 | SYSTOLIC BLOOD PRESSURE: 119 MMHG | HEIGHT: 63 IN | OXYGEN SATURATION: 97 % | WEIGHT: 122.5 LBS | DIASTOLIC BLOOD PRESSURE: 77 MMHG | HEART RATE: 100 BPM

## 2025-07-26 DIAGNOSIS — N17.9 AKI (ACUTE KIDNEY INJURY): ICD-10-CM

## 2025-07-26 DIAGNOSIS — J18.9 PNEUMONIA OF LEFT LOWER LOBE DUE TO INFECTIOUS ORGANISM: ICD-10-CM

## 2025-07-26 DIAGNOSIS — R11.0 NAUSEA: Primary | ICD-10-CM

## 2025-07-26 PROCEDURE — 1111F DSCHRG MED/CURRENT MED MERGE: CPT | Performed by: STUDENT IN AN ORGANIZED HEALTH CARE EDUCATION/TRAINING PROGRAM

## 2025-07-26 PROCEDURE — 99496 TRANSJ CARE MGMT HIGH F2F 7D: CPT | Performed by: STUDENT IN AN ORGANIZED HEALTH CARE EDUCATION/TRAINING PROGRAM

## 2025-07-26 RX ORDER — MECLIZINE HYDROCHLORIDE 25 MG/1
25 TABLET ORAL 3 TIMES DAILY PRN
COMMUNITY
Start: 2025-07-24

## 2025-07-26 RX ORDER — ONDANSETRON 4 MG/1
4 TABLET, FILM COATED ORAL EVERY 8 HOURS PRN
Qty: 60 TABLET | Refills: 1 | Status: SHIPPED | OUTPATIENT
Start: 2025-07-26

## 2025-07-26 RX ORDER — PREDNISONE 10 MG/1
TABLET ORAL
Qty: 5 TABLET | Refills: 0 | Status: SHIPPED | OUTPATIENT
Start: 2025-07-26 | End: 2025-08-02

## 2025-07-30 ENCOUNTER — OFFICE VISIT (OUTPATIENT)
Dept: PULMONOLOGY | Facility: CLINIC | Age: 75
End: 2025-07-30
Payer: MEDICARE

## 2025-07-30 VITALS
OXYGEN SATURATION: 98 % | DIASTOLIC BLOOD PRESSURE: 63 MMHG | WEIGHT: 122 LBS | BODY MASS INDEX: 21.62 KG/M2 | SYSTOLIC BLOOD PRESSURE: 98 MMHG | RESPIRATION RATE: 18 BRPM | HEART RATE: 86 BPM | HEIGHT: 63 IN

## 2025-07-30 DIAGNOSIS — J18.9 PNEUMONIA OF LEFT LOWER LOBE DUE TO INFECTIOUS ORGANISM: Primary | ICD-10-CM

## 2025-07-30 PROCEDURE — 99214 OFFICE O/P EST MOD 30 MIN: CPT | Performed by: INTERNAL MEDICINE

## 2025-07-30 PROCEDURE — 1159F MED LIST DOCD IN RCRD: CPT | Performed by: INTERNAL MEDICINE

## 2025-07-30 PROCEDURE — 1126F AMNT PAIN NOTED NONE PRSNT: CPT | Performed by: INTERNAL MEDICINE

## 2025-07-30 PROCEDURE — 1111F DSCHRG MED/CURRENT MED MERGE: CPT | Performed by: INTERNAL MEDICINE

## 2025-07-30 PROCEDURE — 1160F RVW MEDS BY RX/DR IN RCRD: CPT | Performed by: INTERNAL MEDICINE

## (undated) DEVICE — UNIVERSAL STAPLER: Brand: ENDO GIA ULTRA

## (undated) DEVICE — LAP CHOLE: Brand: MEDLINE INDUSTRIES, INC.

## (undated) DEVICE — MARYLAND JAW LAPAROSCOPIC SEALER/DIVIDER COATED: Brand: LIGASURE

## (undated) DEVICE — GLOVE SUR 8 SENSICARE PI PIP CRM PWD F

## (undated) DEVICE — TROCAR: Brand: KII SHIELDED BLADED ACCESS SYSTEM

## (undated) DEVICE — TROCAR: Brand: KII FIOS FIRST ENTRY

## (undated) DEVICE — SUT MCRYL 3-0 18IN PS-2 ABSRB UD 19MM 3/8 CIR

## (undated) DEVICE — INSUFFLATION NEEDLE TO ESTABLISH PNEUMOPERITONEUM.: Brand: INSUFFLATION NEEDLE

## (undated) DEVICE — [HIGH FLOW INSUFFLATOR,  DO NOT USE IF PACKAGE IS DAMAGED,  KEEP DRY,  KEEP AWAY FROM SUNLIGHT,  PROTECT FROM HEAT AND RADIOACTIVE SOURCES.]: Brand: PNEUMOSURE

## (undated) DEVICE — SUT COAT VCRL+ 0 27IN UR-6 ABSRB VLT ANTIBACT

## (undated) DEVICE — GLOVE SUR 8 SENSICARE PI PIP GRN PWD F

## (undated) DEVICE — SOLUTION IV 1000ML 0.9% NACL PRESERVATIVE

## (undated) DEVICE — TISSUE RETRIEVAL SYSTEM: Brand: INZII RETRIEVAL SYSTEM

## (undated) DEVICE — SOLUTION IRRIG 1000ML 0.9% NACL USP BTL

## (undated) DEVICE — ARTICULATION RELOAD WITH TRI-STAPLE TECHNOLOGY: Brand: ENDO GIA

## (undated) DEVICE — GLOVE SUR 7 SENSICARE PI MIC PIP CRM PWD F

## (undated) DEVICE — Device: Brand: JELCO